# Patient Record
Sex: MALE | Race: BLACK OR AFRICAN AMERICAN | NOT HISPANIC OR LATINO | URBAN - METROPOLITAN AREA
[De-identification: names, ages, dates, MRNs, and addresses within clinical notes are randomized per-mention and may not be internally consistent; named-entity substitution may affect disease eponyms.]

---

## 2019-08-28 ENCOUNTER — INPATIENT (INPATIENT)
Facility: HOSPITAL | Age: 34
LOS: 1 days | Discharge: HOME | DRG: 603 | End: 2019-08-30
Attending: INTERNAL MEDICINE | Admitting: INTERNAL MEDICINE
Payer: MEDICAID

## 2019-08-28 VITALS
RESPIRATION RATE: 18 BRPM | DIASTOLIC BLOOD PRESSURE: 82 MMHG | SYSTOLIC BLOOD PRESSURE: 139 MMHG | OXYGEN SATURATION: 100 % | WEIGHT: 162.04 LBS | HEART RATE: 118 BPM | TEMPERATURE: 99 F

## 2019-08-28 DIAGNOSIS — B20 HUMAN IMMUNODEFICIENCY VIRUS [HIV] DISEASE: ICD-10-CM

## 2019-08-28 DIAGNOSIS — R69 ILLNESS, UNSPECIFIED: ICD-10-CM

## 2019-08-28 DIAGNOSIS — Z98.890 OTHER SPECIFIED POSTPROCEDURAL STATES: Chronic | ICD-10-CM

## 2019-08-28 DIAGNOSIS — F41.9 ANXIETY DISORDER, UNSPECIFIED: ICD-10-CM

## 2019-08-28 DIAGNOSIS — L03.119 CELLULITIS OF UNSPECIFIED PART OF LIMB: ICD-10-CM

## 2019-08-28 DIAGNOSIS — Z91.89 OTHER SPECIFIED PERSONAL RISK FACTORS, NOT ELSEWHERE CLASSIFIED: ICD-10-CM

## 2019-08-28 DIAGNOSIS — F19.20 OTHER PSYCHOACTIVE SUBSTANCE DEPENDENCE, UNCOMPLICATED: ICD-10-CM

## 2019-08-28 DIAGNOSIS — Z72.51 HIGH RISK HETEROSEXUAL BEHAVIOR: ICD-10-CM

## 2019-08-28 DIAGNOSIS — Z29.9 ENCOUNTER FOR PROPHYLACTIC MEASURES, UNSPECIFIED: ICD-10-CM

## 2019-08-28 DIAGNOSIS — F15.10 OTHER STIMULANT ABUSE, UNCOMPLICATED: ICD-10-CM

## 2019-08-28 LAB
ALBUMIN SERPL ELPH-MCNC: 3.8 G/DL — SIGNIFICANT CHANGE UP (ref 3.3–5)
ALP SERPL-CCNC: 75 U/L — SIGNIFICANT CHANGE UP (ref 40–120)
ALT FLD-CCNC: 30 U/L — SIGNIFICANT CHANGE UP (ref 10–45)
ANION GAP SERPL CALC-SCNC: 10 MMOL/L — SIGNIFICANT CHANGE UP (ref 5–17)
APTT BLD: 26.9 SEC — LOW (ref 27.5–36.3)
AST SERPL-CCNC: 33 U/L — SIGNIFICANT CHANGE UP (ref 10–40)
BASOPHILS # BLD AUTO: 0.03 K/UL — SIGNIFICANT CHANGE UP (ref 0–0.2)
BASOPHILS NFR BLD AUTO: 0.3 % — SIGNIFICANT CHANGE UP (ref 0–2)
BILIRUB SERPL-MCNC: 0.2 MG/DL — SIGNIFICANT CHANGE UP (ref 0.2–1.2)
BUN SERPL-MCNC: 11 MG/DL — SIGNIFICANT CHANGE UP (ref 7–23)
CALCIUM SERPL-MCNC: 9.4 MG/DL — SIGNIFICANT CHANGE UP (ref 8.4–10.5)
CHLORIDE SERPL-SCNC: 102 MMOL/L — SIGNIFICANT CHANGE UP (ref 96–108)
CO2 SERPL-SCNC: 28 MMOL/L — SIGNIFICANT CHANGE UP (ref 22–31)
CREAT SERPL-MCNC: 0.85 MG/DL — SIGNIFICANT CHANGE UP (ref 0.5–1.3)
EOSINOPHIL # BLD AUTO: 0.09 K/UL — SIGNIFICANT CHANGE UP (ref 0–0.5)
EOSINOPHIL NFR BLD AUTO: 0.9 % — SIGNIFICANT CHANGE UP (ref 0–6)
GLUCOSE SERPL-MCNC: 69 MG/DL — LOW (ref 70–99)
HCT VFR BLD CALC: 42 % — SIGNIFICANT CHANGE UP (ref 39–50)
HGB BLD-MCNC: 13.4 G/DL — SIGNIFICANT CHANGE UP (ref 13–17)
IMM GRANULOCYTES NFR BLD AUTO: 1.7 % — HIGH (ref 0–1.5)
INR BLD: 1.03 — SIGNIFICANT CHANGE UP (ref 0.88–1.16)
LACTATE SERPL-SCNC: 1.9 MMOL/L — SIGNIFICANT CHANGE UP (ref 0.5–2)
LYMPHOCYTES # BLD AUTO: 1.87 K/UL — SIGNIFICANT CHANGE UP (ref 1–3.3)
LYMPHOCYTES # BLD AUTO: 19.6 % — SIGNIFICANT CHANGE UP (ref 13–44)
MCHC RBC-ENTMCNC: 29.7 PG — SIGNIFICANT CHANGE UP (ref 27–34)
MCHC RBC-ENTMCNC: 31.9 GM/DL — LOW (ref 32–36)
MCV RBC AUTO: 93.1 FL — SIGNIFICANT CHANGE UP (ref 80–100)
MONOCYTES # BLD AUTO: 1.12 K/UL — HIGH (ref 0–0.9)
MONOCYTES NFR BLD AUTO: 11.7 % — SIGNIFICANT CHANGE UP (ref 2–14)
NEUTROPHILS # BLD AUTO: 6.29 K/UL — SIGNIFICANT CHANGE UP (ref 1.8–7.4)
NEUTROPHILS NFR BLD AUTO: 65.8 % — SIGNIFICANT CHANGE UP (ref 43–77)
NRBC # BLD: 0 /100 WBCS — SIGNIFICANT CHANGE UP (ref 0–0)
PLATELET # BLD AUTO: 295 K/UL — SIGNIFICANT CHANGE UP (ref 150–400)
POTASSIUM SERPL-MCNC: 4.2 MMOL/L — SIGNIFICANT CHANGE UP (ref 3.5–5.3)
POTASSIUM SERPL-SCNC: 4.2 MMOL/L — SIGNIFICANT CHANGE UP (ref 3.5–5.3)
PROT SERPL-MCNC: 7.6 G/DL — SIGNIFICANT CHANGE UP (ref 6–8.3)
PROTHROM AB SERPL-ACNC: 11.6 SEC — SIGNIFICANT CHANGE UP (ref 10–12.9)
RBC # BLD: 4.51 M/UL — SIGNIFICANT CHANGE UP (ref 4.2–5.8)
RBC # FLD: 13.2 % — SIGNIFICANT CHANGE UP (ref 10.3–14.5)
SODIUM SERPL-SCNC: 140 MMOL/L — SIGNIFICANT CHANGE UP (ref 135–145)
WBC # BLD: 9.56 K/UL — SIGNIFICANT CHANGE UP (ref 3.8–10.5)
WBC # FLD AUTO: 9.56 K/UL — SIGNIFICANT CHANGE UP (ref 3.8–10.5)

## 2019-08-28 PROCEDURE — 99223 1ST HOSP IP/OBS HIGH 75: CPT | Mod: GC

## 2019-08-28 PROCEDURE — 93971 EXTREMITY STUDY: CPT | Mod: 26,RT

## 2019-08-28 PROCEDURE — 73701 CT LOWER EXTREMITY W/DYE: CPT | Mod: 26,RT

## 2019-08-28 PROCEDURE — 73630 X-RAY EXAM OF FOOT: CPT | Mod: 26,RT

## 2019-08-28 PROCEDURE — 90792 PSYCH DIAG EVAL W/MED SRVCS: CPT

## 2019-08-28 PROCEDURE — 99285 EMERGENCY DEPT VISIT HI MDM: CPT | Mod: 25

## 2019-08-28 PROCEDURE — 93010 ELECTROCARDIOGRAM REPORT: CPT

## 2019-08-28 PROCEDURE — 99053 MED SERV 10PM-8AM 24 HR FAC: CPT

## 2019-08-28 RX ORDER — BICTEGRAVIR SODIUM, EMTRICITABINE, AND TENOFOVIR ALAFENAMIDE FUMARATE 30; 120; 15 MG/1; MG/1; MG/1
1 TABLET ORAL DAILY
Refills: 0 | Status: DISCONTINUED | OUTPATIENT
Start: 2019-08-28 | End: 2019-08-30

## 2019-08-28 RX ORDER — QUETIAPINE FUMARATE 200 MG/1
12.5 TABLET, FILM COATED ORAL ONCE
Refills: 0 | Status: COMPLETED | OUTPATIENT
Start: 2019-08-28 | End: 2019-08-28

## 2019-08-28 RX ORDER — ENOXAPARIN SODIUM 100 MG/ML
40 INJECTION SUBCUTANEOUS EVERY 24 HOURS
Refills: 0 | Status: DISCONTINUED | OUTPATIENT
Start: 2019-08-28 | End: 2019-08-28

## 2019-08-28 RX ORDER — AMPICILLIN SODIUM AND SULBACTAM SODIUM 250; 125 MG/ML; MG/ML
3 INJECTION, POWDER, FOR SUSPENSION INTRAMUSCULAR; INTRAVENOUS EVERY 6 HOURS
Refills: 0 | Status: DISCONTINUED | OUTPATIENT
Start: 2019-08-28 | End: 2019-08-29

## 2019-08-28 RX ORDER — VANCOMYCIN HCL 1 G
1250 VIAL (EA) INTRAVENOUS EVERY 12 HOURS
Refills: 0 | Status: DISCONTINUED | OUTPATIENT
Start: 2019-08-28 | End: 2019-08-30

## 2019-08-28 RX ORDER — SODIUM CHLORIDE 9 MG/ML
1000 INJECTION INTRAMUSCULAR; INTRAVENOUS; SUBCUTANEOUS ONCE
Refills: 0 | Status: COMPLETED | OUTPATIENT
Start: 2019-08-28 | End: 2019-08-28

## 2019-08-28 RX ORDER — NICOTINE POLACRILEX 2 MG
2 GUM BUCCAL
Refills: 0 | Status: DISCONTINUED | OUTPATIENT
Start: 2019-08-28 | End: 2019-08-30

## 2019-08-28 RX ORDER — HALOPERIDOL DECANOATE 100 MG/ML
5 INJECTION INTRAMUSCULAR EVERY 8 HOURS
Refills: 0 | Status: DISCONTINUED | OUTPATIENT
Start: 2019-08-28 | End: 2019-08-30

## 2019-08-28 RX ORDER — VANCOMYCIN HCL 1 G
1000 VIAL (EA) INTRAVENOUS ONCE
Refills: 0 | Status: COMPLETED | OUTPATIENT
Start: 2019-08-28 | End: 2019-08-28

## 2019-08-28 RX ADMIN — Medication 1000 MILLIGRAM(S): at 03:25

## 2019-08-28 RX ADMIN — BICTEGRAVIR SODIUM, EMTRICITABINE, AND TENOFOVIR ALAFENAMIDE FUMARATE 1 TABLET(S): 30; 120; 15 TABLET ORAL at 12:14

## 2019-08-28 RX ADMIN — SODIUM CHLORIDE 1000 MILLILITER(S): 9 INJECTION INTRAMUSCULAR; INTRAVENOUS; SUBCUTANEOUS at 03:20

## 2019-08-28 RX ADMIN — AMPICILLIN SODIUM AND SULBACTAM SODIUM 200 GRAM(S): 250; 125 INJECTION, POWDER, FOR SUSPENSION INTRAMUSCULAR; INTRAVENOUS at 23:44

## 2019-08-28 RX ADMIN — Medication 166.67 MILLIGRAM(S): at 15:06

## 2019-08-28 RX ADMIN — Medication 2 MILLIGRAM(S): at 10:25

## 2019-08-28 RX ADMIN — SODIUM CHLORIDE 1000 MILLILITER(S): 9 INJECTION INTRAMUSCULAR; INTRAVENOUS; SUBCUTANEOUS at 02:20

## 2019-08-28 RX ADMIN — Medication 250 MILLIGRAM(S): at 02:27

## 2019-08-28 RX ADMIN — AMPICILLIN SODIUM AND SULBACTAM SODIUM 200 GRAM(S): 250; 125 INJECTION, POWDER, FOR SUSPENSION INTRAMUSCULAR; INTRAVENOUS at 14:24

## 2019-08-28 RX ADMIN — AMPICILLIN SODIUM AND SULBACTAM SODIUM 200 GRAM(S): 250; 125 INJECTION, POWDER, FOR SUSPENSION INTRAMUSCULAR; INTRAVENOUS at 17:53

## 2019-08-28 RX ADMIN — QUETIAPINE FUMARATE 12.5 MILLIGRAM(S): 200 TABLET, FILM COATED ORAL at 23:44

## 2019-08-28 NOTE — H&P ADULT - NSICDXFAMILYHX_GEN_ALL_CORE_FT
FAMILY HISTORY:  Family history of substance abuse, Mother, crack/cocaine  FH: HIV infection, Mother

## 2019-08-28 NOTE — ED BEHAVIORAL HEALTH ASSESSMENT NOTE - DESCRIPTION
from [place] Pt was guarded initially with personal information. He however was cooperative with this exam HIV
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
## 2019-08-28 NOTE — ED ADULT TRIAGE NOTE - CHIEF COMPLAINT QUOTE
pt complaining of pain to two wounds on right foot with purulent drainage x 1 week states he was given IV abx at another hospital over the last few days and denies new trauma/ injury

## 2019-08-28 NOTE — ED ADULT NURSE NOTE - NS ED NURSE DISCH DISPOSITION
Physical Therapy Evaluation    Visit Count: 1   Plan of Care: 6/3/2019 Through: 6/17/2019  Insurance Information: Payor: [employer] - Medicare Complete  Authorization Needed: No  Maximum Visit Limit: Based on medical necessity at point of claim  CoPay: $40.00  Notes: Follow Medicare guidelines  Call Ref #: Online  “Evaluation requires MD, NP, PA, or DO co-signature”  Referred by: Tamika Brumfield MD; Next provider visit (if known/scheduled): 6/12/19  Medical Diagnosis (from order):  719.41, 338.29 (ICD-9-CM) - M25.512, G89.29 (ICD-10-CM) - Chronic left shoulder pain  Treatment Diagnosis: Shoulder symptoms with increased pain/symptoms, impaired posture, impaired strength, impaired range of motion, impaired scapulohumeral rhythm, impaired muscle length/flexibility, increased swelling, impaired joint mobility/play, impaired activity tolerance, impaired body mechanics    Date of Surgery: 5/30/2019 Surgery Performed: Left  shoulder arthroscopy with rotator cuff repair, subacromial decompression, distal clavicle excision - Left and Arthroscopic Rotator Cuff Repair - Left  Physician Guidelines/Precautions: per protocol   Chart reviewed at time of initial evaluation (relevant co-morbidities, allergies, tests and medications listed): Colon cancer, COPD, HLD, RAD, cervical fusion, lumbar fusion    SUBJECTIVE   Description of Problem and/or Mechanism of Injury: Patient had fall in January and was holding onto a fencepost with his L shoulder.  This caused extreme abduction.  After several months of worsening pain he went through with above listed procedure.  Reports that his pain has been well managed, but he has struggled with daily tasks, due to living alone.  Has not been able to perform exercises as much as he would like as a result.    Pain:  Intensity (0-10 scale): Current: 1; Pain Range (best-worse): 1-5  Location: left shoulder   Quality/Description: Sharp  Relieving/Alleviating factors: rest, prescribed medication, avoiding  movement in the involved area    Function:  Hand Dominance: left  Limitations and exacerbating factors: pain, difficulty with all activities/tasks with involved extremity, age appropriate activities, bed mobility, computer tasks , dressing, driving, grooming/hygiene, house/yard work , lifting/carrying, opening a jar, pushing/pulling, reaching overhead, behind back and out to side, sleep disturbed  Prior level: independent with all activities of daily living and instrumental activities of daily living  Patient Goals: \"get back to active lifestyle.\"    Prior Treatment: no therapies in the past year for current condition. Hospitalization, home health services or skilled nursing facility in the last 30 days: No, per patient.  Home Environment/Social Support: Patient lives alone; consistent assist from family/friends available    Safety:  Do you feel safe at home, work and/or school? yes, per patient  Patient denies 2 or more falls or an unexplained fall with injury in the last year, further testing not required     OBJECTIVE   Posture/Observation:  Forward head  Rounded shoulders  Protracted scapulae  Anterior tilt of of bilateral scapulae    Range of Motion (degrees)   Left Right   Date Initial Initial   Shoulder Flexion (170-180) 20 degrees PROM    Shoulder Extension (50-60)     Shoulder Abduction (170-180) 40 degrees PROM    Shoulder Adduction (50-75)     Shoulder Internal Rotation ()     Shoulder External Rotation (80-90)     Elbow Flexion (140-150)     Elbow Extension (0-10)     Forearm Supination (90)     Forearm Pronation (90)     Reported in degrees, active range of motion recorded unless noted as AA=active assistive or P=passive; standard testing positions unless otherwise noted, norms included in ( ); *=pain   uninvolved extremity motion within functional/normal limits., internal rotation to soft tissue of abdomin with elbow at side    Strength:   Testing not indicated due to post-op status/physician  guidelines    Palpation:  General tenderness throughout anterior shoulder structures.     Outcome Measures:   Quick Disabilities of the Arm, Shoulder and Hand (QDASH): 34 (11-55); Calculated Score: 52.27 (0-100) (scored 0-100; a higher score indicates greater disability)    Initial Treatment   Initial evaluation completed.    Therapeutic Exercise:   Pendulum exercises  Cervical, elbow, wrist and hand AROM  Explanation of findings from evaluation, treatment options, and expected outcomes of physical therapy.  Education of relevant anatomy and possible effects on current symptoms.  Education of activity modification to prevent increase in symptoms.    Education of techniques to manage pain and prevent progression of symptoms.     Skilled input: verbal instruction/cues, tactile instruction/cues for proper performance of HEP    Initial Home Program:  * above=instructed home program    See therapeutic exercise above    Writer verbally educated the patient and received verbal consent from the patient on hand placement, positioning of patient, and techniques to be performed today as described above and how they are pertinent to the patient's plan of care.     Suggestions for next session as indicated: progress per plan of care, review home exercise program, progress per guidelines    ASSESSMENT   76 year old male patient has signs and symptoms consistent with status post rotator cuff repair that has reported functional limitations listed above.     Patient will benefit from skilled therapy and rehab potential is good based on assessment above   Predicted patient presentation: Moderate (evolving) - Patient comorbidities and complexities, as defined above, may have varying impact on steady progress for prescribed plan of care.    PLAN   Goals: To be obtained by end of this plan of care:  1. Patient will be independent with progressed and modified home exercise program   2. Decrease pain/symptoms to 0-2/10  3. Improve  involved strength to 4+/5  4. Improve involved range of motion to WFL  through improvements listed above patient will:  5. Be able to complete upper body dressing independently  6. Be able to sleep 6 hours without disruption from pain  7. Quick DASH: Patient will complete form to reflect an improved score from initial score of 34 to less than or equal to 20 to indicate patient reported improvement in function/disability/impairment.    The following skilled interventions to be implemented to achieve above:  Activities of Daily Living/Self Care (28718)  Manual Therapy (90060)  Neuromuscular Re-Education (17787)  Therapeutic Activity (08552)  Therapeutic Exercise (32597)  Electrical Stimulation (45260//52208)   Heat/Cold (63767)  Ultrasound/Phonophoresis (41196)    Frequency/Duration: 2 times per week for 16 weeks with tapering as the patient progresses for an estimated total of 32 visits    patient involved in and agreed to plan of care and goals.   Attendance policy provided at time of evaluation.    Patient Education:  Who will be receiving education: patient  Are they ready to learn: yes  Preferred learning style: written, verbal, demonstration  Barriers to learning: no barriers apparent at this time   Attendance policy given at time of initial evaluation.  Result of initial outlined education: Verbalizes understanding and Demonstrates understanding    THERAPY DAILY BILLING   Insurance: 1. [facility] MEDICARE COMPLETE 2. N/A    Evaluation Procedures:  Physical Therapy Evaluation: Moderate Complexity    Timed Procedures:  Therapeutic Exercise, 10 minutes    Untimed Procedures:  No untimed codes were used on this date of service    Total Treatment Time: 45 minutes        The referring provider's electronic or written signature on the evaluation authorizes the therapy plan of care and certifies the need for these services, furnished under this plan of care while under their care.  Electronically sent for referring  provider signature   Admitted

## 2019-08-28 NOTE — H&P ADULT - NSHPSOCIALHISTORY_GEN_ALL_CORE
Current smoker (approximately 1 PPD x years), denies EtOH abuse, admits to crack/cocaine use (last used last week)

## 2019-08-28 NOTE — H&P ADULT - NSHPPHYSICALEXAM_GEN_ALL_CORE
.  VITAL SIGNS:  T(C): 37.1 (08-28-19 @ 03:45), Max: 37.2 (08-28-19 @ 01:44)  T(F): 98.7 (08-28-19 @ 03:45), Max: 98.9 (08-28-19 @ 01:44)  HR: 94 (08-28-19 @ 03:45) (94 - 118)  BP: 132/80 (08-28-19 @ 03:45) (132/80 - 139/82)  BP(mean): --  RR: 17 (08-28-19 @ 03:45) (17 - 18)  SpO2: 99% (08-28-19 @ 03:45) (99% - 100%)  Wt(kg): --    PHYSICAL EXAM:    Constitutional: WDWN resting comfortably in bed; NAD  Head: NC/AT  Eyes: PERRL, EOMI, anicteric sclera  ENT: no nasal discharge; uvula midline, no oropharyngeal erythema or exudates; MMM  Neck: supple; no JVD or thyromegaly  Respiratory: CTA B/L; no W/R/R, no retractions  Cardiac: +S1/S2; RRR; no M/R/G; PMI non-displaced  Gastrointestinal: abdomen soft, NT/ND; no rebound or guarding; +BSx4  Genitourinary: normal external genitalia  Back: spine midline, no bony tenderness or step-offs; no CVAT B/L  Extremities: WWP, no clubbing or cyanosis; no peripheral edema  Musculoskeletal: NROM x4; no joint swelling, tenderness or erythema  Vascular: 2+ radial, femoral, DP/PT pulses B/L  Dermatologic: skin warm, dry and intact; no rashes, wounds, or scars  Lymphatic: no submandibular or cervical LAD  Neurologic: AAOx3; CNII-XII grossly intact; no focal deficits  Psychiatric: affect and characteristics of appearance, verbalizations, behaviors are appropriate .  VITAL SIGNS:  T(C): 37.1 (08-28-19 @ 03:45), Max: 37.2 (08-28-19 @ 01:44)  T(F): 98.7 (08-28-19 @ 03:45), Max: 98.9 (08-28-19 @ 01:44)  HR: 94 (08-28-19 @ 03:45) (94 - 118)  BP: 132/80 (08-28-19 @ 03:45) (132/80 - 139/82)  BP(mean): --  RR: 17 (08-28-19 @ 03:45) (17 - 18)  SpO2: 99% (08-28-19 @ 03:45) (99% - 100%)  Wt(kg): --    PHYSICAL EXAM:    Constitutional: WDWN  male, soft-spoken, resting comfortably in bed; NAD  Head: NC/AT  Eyes: PERRL, EOMI, anicteric sclera  ENT: no nasal discharge; uvula midline, no oropharyngeal erythema or exudates; MMM  Neck: supple; no JVD or thyromegaly  Respiratory: CTA B/L; no W/R/R, no retractions  Cardiac: +S1/S2; RRR; no M/R/G; PMI non-displaced  Gastrointestinal: abdomen soft, NT/ND; no rebound or guarding; +BSx4  Genitourinary: normal external genitalia  Back: spine midline, no bony tenderness or step-offs; no CVAT B/L  Extremities: WWP, no clubbing or cyanosis; RLE edema with associated erythema and warmth to palpation  Musculoskeletal: NROM x4; no joint swelling, tenderness or erythema  Vascular: 2+ radial, femoral, DP/PT pulses B/L  Dermatologic: e/o of recent I&D overlying dorsal aspect of distal 4th/5th metatarsal, no purulence expressed from site, also e/o recent I&D overlying insertion of achilles tendon with no purulence expressed, very tender to palpation, no crepitus on exam  Lymphatic: no submandibular or cervical LAD  Neurologic: AAOx3; CNII-XII grossly intact; no focal deficits  Psychiatric: affect and characteristics of appearance, verbalizations, behaviors are appropriate

## 2019-08-28 NOTE — ED BEHAVIORAL HEALTH ASSESSMENT NOTE - DETAILS
CL psychiatry to follow pt was recently admitted to [facility] psychiatry mother with likely psychiatric diagnosis mother with crack cocaine use foot pain pain to be admitted to medicine unable ot reach

## 2019-08-28 NOTE — H&P ADULT - HISTORY OF PRESENT ILLNESS
34 y/o PMHx HIV (last CD4 360s, VLUD as of May 2019 per patient, on Biktarvy), anxiety presenting to [facility] ED with complaints of R foot pain and swelling for the last 8 days. Patient's history dates back to earlier this mon    ED VS: T 98.9 F; ; /82; RR 18; Sp02 100 RA  ED Course: Labs with no leukocytosis, negative lactate (1.9), sCr 0.89. ECG NSR with ?LA enlargement. Xray R foot performed, pending official read,   RLE duplex performed, 34 y/o PMHx HIV (sexually contracted, dx 2007, last CD4 360s, VLUD as of May 2019 per patient, on Biktarvy), anxiety presenting to [facility] ED with complaints of R foot pain and swelling for the last 8 days. Patient's history dates back to earlier this month when he was at [facility] inpatient psych for a reported anxiety attack. Patient is vague surrounding details of his inpatient stay. While there, approximately 8 days ago, patient noted have developed R foot pain with associated swelling and redness. He reports telling the staff there at the time about his R foot pain however notes his complaints were disregarded. Denies any trauma to the area. He notes that approximately 2 days ago, he was discharged from inpatient psych, after which he went back to a friend's house to eat/change his clothes. From there, he called 911 to pick him up for his R foot pain. He then brought back to [facility] ED for further evaluation. Patient states there he had imaging done, was given IVF and abxs and had an I&D done on the dorsum of his foot (between the distal 4th and 5th metatarsal bones) and at around the insertion of the achilles tendon where he noted pus came out of the foot. There were no reported beds at [facility] so he requested that he be transfered to another hospital, [facility]. There, he notes he was told he may need surgery for unclear reasons however given the wait time there and his dissatisfaction with the care he was receiving, he left the hospital and came to [facility] for further evaluation. ROS other negative for fevers, chills, NS, chest pain, shortness of breath. Remainder of ROS negative.      ED VS: T 98.9 F; ; /82; RR 18; Sp02 100 RA  ED Course: Labs with no leukocytosis, negative lactate (1.9), sCr 0.89. ECG NSR with ?LA enlargement. Xray R foot performed, pending official read,   RLE duplex performed,

## 2019-08-28 NOTE — PROGRESS NOTE ADULT - PROBLEM SELECTOR PLAN 1
Patient presented with worsening R foot pain x8d. Found to have purulent lesions at R dorsum and Achilles insertion with swelling, s/p I&D and UNK IV abx at [facility] ED. RLE CT showed cellulitis with abscesses at Achilles tendon - no evidence of osteomyelitis or nec fasc. Duplex US showed no DVTs. Pt is s/p 1g Vancomycin in ED, now treated with 1250 mg Vanc q12h. Pt is afebrile with normal WBC.   - c/w Vancomycin 1250mg q12h. Next trough tomorrow at 1pm  - f/u Bcx: no growth to date  - consult podiatry for possible I&D Patient presented with worsening R foot pain x8d. Found to have purulent lesions at R dorsum and Achilles insertion with swelling, s/p I&D and UNK IV abx at [facility] ED. RLE CT showed cellulitis with abscesses at Achilles tendon - no evidence of osteomyelitis or nec fasc. Duplex US showed no DVTs. Pt is s/p 1g Vancomycin in ED, now treated with 1250 mg Vanc q12h. Pt is afebrile with normal WBC.   -c/w Vancomycin 1250mg q12h. Next trough tomorrow at 1pm  -c/w unasyn 3g q6, given IV drug use in legs  - f/u Bcx: no growth to date  - consult podiatry for possible I&D

## 2019-08-28 NOTE — H&P ADULT - PROBLEM SELECTOR PLAN 5
Patient endorses at least 4 sexual female partners within the last 6 months, inconsistent condom use, PE with inguinal LAD  -G/C (Urine)  -RPR

## 2019-08-28 NOTE — H&P ADULT - PROBLEM SELECTOR PLAN 2
Patient with history of HIV, sexually contracted, diagnosed in 2007, on Biktarvy however reports missed last week of medication as did not have a script for this. Also noted being on Bactrim, last reported CD4 in 360s with VLUD in May 2019.   -f/u VL, CD4  -c/w Biktarvy  -c/w Bactrim for PCP ppx for now, can discontinue if CD4>200 Patient with history of HIV, sexually contracted, diagnosed in 2007, on Biktarvy however reports missed last week of medication as did not have a script for this. Also noted being on Bactrim, last reported CD4 in 360s with VLUD in May 2019.   -f/u VL, CD4  -c/w Biktarvy  -If CD4 <200, re-start Bactrim

## 2019-08-28 NOTE — ED BEHAVIORAL HEALTH ASSESSMENT NOTE - DIFFERENTIAL
crystal meth use disorder  r/o substance induced psychosis/anxiety  r/o DIVYA  r/o Bipolar disorder, current episode hypomanic

## 2019-08-28 NOTE — PROGRESS NOTE ADULT - PROBLEM SELECTOR PLAN 5
Endorses 4+ sexual partners (female) within last 6 months w/ inconsistent condom use. Pt has inguinal LAD on exam on presentation.  - F/u RPR and G/C

## 2019-08-28 NOTE — ED PROVIDER NOTE - OBJECTIVE STATEMENT
33M poor historian with a h/o anxiety, HIV and substance abuse (crystal meth) who present with right foot cellulitis. He states his foot started hurting him about 8 days ago, he was admitted to [facility] for anxiety and then transfer to [facility] when his foot started hurting. He states he was given antibiotics and 2 areas on his foot were drained. he felt that he was "waiting around forever" at [facility] so he left and came to [facility]. C/o pain and swelling to right foot, +chills, no fever, no cp/sob, no dizziness/LH, denies trauma to foot, no hx of DVt. he denies SI/HI or hallucinations at this time.

## 2019-08-28 NOTE — H&P ADULT - PROBLEM SELECTOR PLAN 6
F: no IVF indicated   E: Replete electrolytes PRN, Goal: K>4, Mg>2  N: Regular diet    DVT ppx: Lovenox 40 mg daily  CODE: FULL  Dispo: Admit to F

## 2019-08-28 NOTE — ED ADULT NURSE REASSESSMENT NOTE - NS ED NURSE REASSESS COMMENT FT1
pt. using urinal, per request, will check cbg due to serum glucose at time of lab draw, as noted, has remained a-symptomatic throughout stay

## 2019-08-28 NOTE — ED PROVIDER NOTE - PHYSICAL EXAMINATION
GEN: Well developed, well nourished, awake, alert, oriented to person, place, time/situation and appears anxious. NTAF  ENT: Airway patent, Nasal mucosa clear. Mouth with normal mucosa.  EYES: Clear bilaterally.  RESPIRATORY: Breathing comfortably with normal RR.  CARDIAC: Regular rate and rhythm  ABDOMEN: Soft, nontender, +bowel sounds, no rebound, rigidity, or guarding.  MSK: Range of motion is not limited, +RLE swelling, erythema and TTP, distal pulses present, ext warm and well-perfused.  NEURO: Alert and oriented, no focal deficits.  SKIN: Skin normal color for race, warm, dry . Right foot STS and cellulitis with open wound (?previous I&D sites) to right posterio ankle and right dorsum of foot near base of 4th toe. Very TTP, no active bleeding or purulence, no crepitus or gangrene.  PSYCH: Alert and oriented to person, place, time/situation. anxious mood and affect. no apparent risk to self or others.

## 2019-08-28 NOTE — ED BEHAVIORAL HEALTH ASSESSMENT NOTE - PSYCHIATRIC ISSUES AND PLAN (INCLUDE STANDING AND PRN MEDICATION)
Seroquel 25 mg qhs to address hypomanic symtpoms/anxiety/insomnia. Seroquel 25 mg q 6 hrs prn anxiety/psychosis

## 2019-08-28 NOTE — PROGRESS NOTE ADULT - PROBLEM SELECTOR PLAN 4
H/o anxiety requiring admission to [facility] unit for 6days, for which he was discharged 2 days prior to presenting to ED. On Risperdal 2mg at home for anxiety.   - Continue Risperdal  - Obtain collateral from [facility] inpatient psych for recent admission H/o anxiety requiring admission to [facility] unit for 6days, for which he was discharged 2 days prior to presenting to ED. On Risperdal 2mg at home for anxiety.   - Continue Risperdal  - Pysch consult given aggressive behavior and recent [facility] admission at [facility]  - Obtain collateral from [facility] inpatient psych for recent admission

## 2019-08-28 NOTE — H&P ADULT - NSICDXPASTMEDICALHX_GEN_ALL_CORE_FT
PAST MEDICAL HISTORY:  Anxiety     Drug abuse and dependence     HIV (human immunodeficiency virus infection)

## 2019-08-28 NOTE — PROGRESS NOTE ADULT - ASSESSMENT
33M w/ PMH HIV (since 2007, last CD4 in 360s, VLUD in May 2019 per pt), h/o recent inpatient psych admission at [facility], presenting to ED with R foot painful lesions and swelling x8d, currently admitted for purulent cellulitis, treated with Vancomycin.

## 2019-08-28 NOTE — ED ADULT NURSE NOTE - OBJECTIVE STATEMENT
33M poor historian with a h/o anxiety, HIV and substance abuse (crystal meth) who present with right foot cellulitis. He states his foot started hurting him about 8 days ago, he was admitted to [facility] for anxiety and then transfer to [facility] when his foot started hurting. He states he was given antibiotics and 2 areas on his foot were drained. he felt that he was "waiting around forever" at [facility] so he left and came to [facility]. C/o pain and swelling to right foot, +chills, no fever, no cp/sob, no dizziness/LH, denies trauma to foot, no hx of DVt. he denies SI/HI or hallucinations at this time.  Edema noted to Rt foot, CMS in-tact, 2 abscess drainage sites present

## 2019-08-28 NOTE — H&P ADULT - PROBLEM SELECTOR PLAN 1
Patient presenting with worsening R foot pain for the last 8 days. Patient presenting with worsening R foot pain for the last 8 days. Found to have an abscess on dorsum of R foot between distal insertion of metastarsal heads    -s/p Vanc 1g in ED, will resume with vanc 1.25 g q12h  -f/u Bcx  -CT RLE to further assess for nec fascitis, OM

## 2019-08-28 NOTE — ED PROVIDER NOTE - CLINICAL SUMMARY MEDICAL DECISION MAKING FREE TEXT BOX
33M with right foot cellulitis, poor historian, but appears to have has 2 abscesses drained at outside hospital and left prior to completion or workup/treatment. Foot very swollen, TTP and cellulitic, well-perfused. Will get labs, BCx, XR foot, RLE US to r/o DVT, admit for IV abx and further mgmt.

## 2019-08-28 NOTE — ED BEHAVIORAL HEALTH ASSESSMENT NOTE - DESCRIPTION (FIRST USE, LAST USE, QUANTITY, FREQUENCY, DURATION)
no use for two weeks IV crystal meth use, last use 2 weeks ago. Reports hx/o inpatient and outpatient treatment
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
## 2019-08-28 NOTE — H&P ADULT - ATTENDING COMMENTS
Patient seen and examined at bedside. Agree with exam, a/p as above with the following addendum/edits:    [facility] obtained further collateral. At [facility] for what seems like schizophrenia. Current IV meth use and possibly injects into legs. This is likely the cause of his abscesses on his feet. Denies licking needles before IV drug use. However will add Unasyn for gram neg and anaerobic coverage. C/w Vanc. Psych consulted. F/u podiatry recs, now s/p I and D. F/u culture data. [facility]A consult.

## 2019-08-28 NOTE — H&P ADULT - PROBLEM SELECTOR PLAN 3
Patient with history of current crystal meth use, last used last week. Denies IVDU  -Urine toxicology  - consult

## 2019-08-28 NOTE — PROGRESS NOTE ADULT - SUBJECTIVE AND OBJECTIVE BOX
INTERVAL HPI/OVERNIGHT EVENTS: As per nurse and patient, no o/n events.    SUBJECTIVE: Patient was seen and examined at bedside.  No complaints at this time. Patient denies: fever, chills, dizziness, weakness, HA, Changes in vision, CP, palpitations, SOB, cough, N/V/D/C, dysuria, changes in bowel movements, LE edema. ROS otherwise negative.    VITAL SIGNS:  T(F): 98.5 (08-28-19 @ 07:24)  HR: 93 (08-28-19 @ 07:24)  BP: 122/75 (08-28-19 @ 07:24)  RR: 17 (08-28-19 @ 07:24)  SpO2: 99% (08-28-19 @ 07:24)  Wt(kg): --    PHYSICAL EXAM:    Constitutional: WDWN, NAD  HEENT: PERRL, EOMI, sclera non-icteric, neck supple, trachea midline, no masses, no JVD, MMM, good dentition  Respiratory: CTA b/l, good air entry b/l, no wheezing, no rhonchi, no rales, without accessory muscle use and no intercostal retractions  Cardiovascular: RRR, normal S1S2, no M/R/G  Gastrointestinal: soft, NTND, no masses palpable, BS normal  Extremities: Warm, well perfused, pulses equal bilateral upper and lower extremities, no edema, no clubbing. Capillary refill <2 sec  Neurological: AAOx3, CN Grossly intact  Skin: Normal temperature, warm, dry    MEDICATIONS  (STANDING):  bictegravir 50 mG/emtricitabine 200 mG/tenofovir alafenamide 25 mG (BIKTARVY) 1 Tablet(s) Oral daily  vancomycin  IVPB 1250 milliGRAM(s) IV Intermittent every 12 hours    MEDICATIONS  (PRN):  nicotine  Polacrilex Gum 2 milliGRAM(s) Oral every 3 hours PRN Nicotine craving      Allergies    No Known Allergies    Intolerances        LABS:                        12.1   9.01  )-----------( 310      ( 28 Aug 2019 11:02 )             37.6     08-28    140  |  102  |  11  ----------------------------<  69<L>  4.2   |  28  |  0.85    Ca    9.4      28 Aug 2019 02:22    TPro  7.6  /  Alb  3.8  /  TBili  0.2  /  DBili  x   /  AST  33  /  ALT  30  /  AlkPhos  75  08-28    PT/INR - ( 28 Aug 2019 02:22 )   PT: 11.6 sec;   INR: 1.03          PTT - ( 28 Aug 2019 02:22 )  PTT:26.9 sec      RADIOLOGY & ADDITIONAL TESTS:  Reviewed INTERVAL HPI/OVERNIGHT EVENTS: As per nurse and patient, no o/n events.    SUBJECTIVE: Patient was seen and examined at bedside.  Endorses continuing R foot pain and swelling with lesions draining brown colored pus. Pain is sharp in quality. Reports feeling intermittently feverish and chilly. Pt cannot recall which abx he received in [facility] ED, but notes he received Tylenol. Patient denies: dizziness, weakness, HA, Changes in vision, CP, palpitations, SOB, cough, N/V/D/C, dysuria, changes in bowel movements. ROS otherwise negative.    VITAL SIGNS:  T(F): 98.5 (08-28-19 @ 07:24)  HR: 93 (08-28-19 @ 07:24)  BP: 122/75 (08-28-19 @ 07:24)  RR: 17 (08-28-19 @ 07:24)  SpO2: 99% (08-28-19 @ 07:24)  Wt(kg): --    PHYSICAL EXAM:    Constitutional: distress due to pain, mild diaphoresis visible  HEENT: PERRL, EOMI, sclera non-icteric, neck supple, trachea midline, no masses, no JVD, MMM, good dentition  Respiratory: CTA b/l, good air entry b/l, no wheezing, no rhonchi, no rales, without accessory muscle use and no intercostal retractions  Cardiovascular: RRR, normal S1S2, no M/R/G  Gastrointestinal: soft, NTND, no masses palpable, BS normal  Extremities: R dorsum edematous and warm to touch. Lesion at R dorsum between 4th and 5th metatarsals, currently wrapped; no purulence noted. Lesion at R Achilles insertion point with purulent drainage. R foot exquisitely TTP.   Neurological: AAOx3, CN Grossly intact  Skin: Diaphoresis visible on face    MEDICATIONS  (STANDING):  bictegravir 50 mG/emtricitabine 200 mG/tenofovir alafenamide 25 mG (BIKTARVY) 1 Tablet(s) Oral daily  vancomycin  IVPB 1250 milliGRAM(s) IV Intermittent every 12 hours    MEDICATIONS  (PRN):  nicotine  Polacrilex Gum 2 milliGRAM(s) Oral every 3 hours PRN Nicotine craving      Allergies    No Known Allergies    Intolerances        LABS:                        12.1   9.01  )-----------( 310      ( 28 Aug 2019 11:02 )             37.6     08-28    140  |  102  |  11  ----------------------------<  69<L>  4.2   |  28  |  0.85    Ca    9.4      28 Aug 2019 02:22    TPro  7.6  /  Alb  3.8  /  TBili  0.2  /  DBili  x   /  AST  33  /  ALT  30  /  AlkPhos  75  08-28    PT/INR - ( 28 Aug 2019 02:22 )   PT: 11.6 sec;   INR: 1.03          PTT - ( 28 Aug 2019 02:22 )  PTT:26.9 sec      RADIOLOGY & ADDITIONAL TESTS:  Reviewed

## 2019-08-28 NOTE — H&P ADULT - PROBLEM SELECTOR PLAN 7
1) PCP Contacted on Admission: (Y/N) --> Name & Phone #: Flo Hyden (Primary Care)  2) Date of Contact with PCP: Can contact in AM to obtain collateral on history  3) PCP Contacted at Discharge: (Y/N)  4) Summary of Handoff Given to PCP:   5) Post-Discharge Appointment Date and Location:

## 2019-08-28 NOTE — PROGRESS NOTE ADULT - SUBJECTIVE AND OBJECTIVE BOX
Pt was asleep. This writer attempted to wake pt. However pt was not able vs not willing to speak with this writer.   As per SW pt has hx/o recent psychiatric admission at [facility]. Pt was diagnosed with "Bipolar disorder, anxiety, possibly schizophrenia vs substance induced psychosis and ongoing IIV crystal meth use" as per community SW. Pt has hx/o leaving hospitals AMA. As per SW he refused to discuss details of his medical and psychiatric history earlier due to lack of privacy in the ED.   Pt has a  in the community [facility] [phone] who provided above clinical information.    Please re-consult when pt is alert. For acute agitation may use Haldol 5 mg q 8 hrs prn and Ativan 2 mg q 8 hrs prn.   CL psychiatry will follow in am.

## 2019-08-28 NOTE — ED ADULT NURSE REASSESSMENT NOTE - NS ED NURSE REASSESS COMMENT FT1
pt. back from US, resting RT recumbent, nad or change in condition[, assessment on-going awaiting results/dispo., pt. utd, with understanding verbalized

## 2019-08-28 NOTE — H&P ADULT - PROBLEM SELECTOR PLAN 4
Patient reports history of anxiety, requiring recent admission to [facility] inpatient psych unit. Vague surrounding details of inpatient stay. Unclear if other underlying psychiatric issues. On Risperdol at home for which he notes is to help "calm him down"  -c/w Risperdal 2 mg daily  -Collateral from [facility] inpatient psych re: recent admission

## 2019-08-28 NOTE — CONSULT NOTE ADULT - SUBJECTIVE AND OBJECTIVE BOX
Attending: Meenutannamelissa    Patient is a 33y old  Male who presents with a chief complaint of R foot pain (28 Aug 2019 11:10)    HPI:  32 y/o PMHx HIV (sexually contracted, dx 2007, last CD4 360s, VLUD as of May 2019 per patient, on Biktarvy), anxiety presenting to [facility] ED with complaints of R foot pain and swelling for the last 8 days. Patient's history dates back to earlier this month when he was at [facility] inpatient psych for a reported anxiety attack. Patient is vague surrounding details of his inpatient stay. While there, approximately 8 days ago, patient noted have developed R foot pain with associated swelling and redness. He reports telling the staff there at the time about his R foot pain however notes his complaints were disregarded. Denies any trauma to the area. He notes that approximately 2 days ago, he was discharged from inpatient psych, after which he went back to a friend's house to eat/change his clothes. From there, he called 911 to pick him up for his R foot pain. He then brought back to [facility] ED for further evaluation. Patient states there he had imaging done, was given IVF and abxs and had an I&D done on the dorsum of his foot (between the distal 4th and 5th metatarsal bones) and at around the insertion of the achilles tendon where he noted pus came out of the foot. There were no reported beds at [facility] so he requested that he be transfered to another hospital, [facility]. There, he notes he was told he may need surgery for unclear reasons however given the wait time there and his dissatisfaction with the care he was receiving, he left the hospital and came to [facility] for further evaluation. ROS other negative for fevers, chills, NS, chest pain, shortness of breath. Remainder of ROS negative.      ED VS: T 98.9 F; ; /82; RR 18; Sp02 100 RA  ED Course: Labs with no leukocytosis, negative lactate (1.9), sCr 0.89. ECG NSR with ?LA enlargement. Xray R foot performed, pending official read,   RLE duplex performed, (28 Aug 2019 04:09)    Podiatry was consulted for right lower extremity achilles tendon abscess and right dorsal foot wound. Patient endorses pain both areas. Reports both wounds have been present for 8 days ago. Admits that the wounds started from scratching the areas. yellow/white drainage from the wounds.     Review of systems negative except per HPI    PAST MEDICAL & SURGICAL HISTORY:  Anxiety  HIV (human immunodeficiency virus infection)  Drug abuse and dependence  Status post incision and drainage    Home Medications:  Bactrim  mg-160 mg oral tablet: 1 tab(s) orally once a day (28 Aug 2019 04:23)  Biktarvy oral tablet: 1 tab(s) orally once a day (28 Aug 2019 04:23)    Allergies  No Known Allergies    Intolerances    FAMILY HISTORY:  Family history of substance abuse: Mother, crack/cocaine  FH: HIV infection: Mother    Social History:       LABS                        12.1   9.01  )-----------( 310      ( 28 Aug 2019 11:02 )             37.6     08-28    140  |  102  |  11  ----------------------------<  69<L>  4.2   |  28  |  0.85    Ca    9.4      28 Aug 2019 02:22    TPro  7.6  /  Alb  3.8  /  TBili  0.2  /  DBili  x   /  AST  33  /  ALT  30  /  AlkPhos  75  08-28  PT/INR - ( 28 Aug 2019 02:22 )   PT: 11.6 sec;   INR: 1.03     PTT - ( 28 Aug 2019 02:22 )  PTT:26.9 sec    Vital Signs Last 24 Hrs  T(C): 36.9 (28 Aug 2019 07:24), Max: 37.2 (28 Aug 2019 01:44)  T(F): 98.5 (28 Aug 2019 07:24), Max: 98.9 (28 Aug 2019 01:44)  HR: 93 (28 Aug 2019 07:24) (93 - 118)  BP: 122/75 (28 Aug 2019 07:24) (122/75 - 139/82)  BP(mean): --  RR: 17 (28 Aug 2019 07:24) (17 - 18)  SpO2: 99% (28 Aug 2019 07:24) (99% - 100%)    PHYSICAL EXAM  General: NAD, AA0x3    Lower Extremity Focused:  Vasc: 2/4 DP/PT pulses. both extremities warm to warm. Nonpitting pedal, perimalleolar and pretibial edema  Derm: Superficial wound 1cm by 2cm dorsum 4th webspace with granular tissue. Superificial abscess lateral to achilles tendon  Neuro:  MSK:     RADIOLOGY Attending: Flori    Patient is a 33y old  Male who presents with a chief complaint of R foot pain (28 Aug 2019 11:10)    HPI:  32 y/o PMHx HIV (sexually contracted, dx 2007, last CD4 360s, VLUD as of May 2019 per patient, on Biktarvy), anxiety presenting to [facility] ED with complaints of R foot pain and swelling for the last 8 days. Patient's history dates back to earlier this month when he was at [facility] inpatient psych for a reported anxiety attack. Patient is vague surrounding details of his inpatient stay. While there, approximately 8 days ago, patient noted have developed R foot pain with associated swelling and redness. He reports telling the staff there at the time about his R foot pain however notes his complaints were disregarded. Denies any trauma to the area. He notes that approximately 2 days ago, he was discharged from inpatient psych, after which he went back to a friend's house to eat/change his clothes. From there, he called 911 to pick him up for his R foot pain. He then brought back to [facility] ED for further evaluation. Patient states there he had imaging done, was given IVF and abxs and had an I&D done on the dorsum of his foot (between the distal 4th and 5th metatarsal bones) and at around the insertion of the achilles tendon where he noted pus came out of the foot. There were no reported beds at [facility] so he requested that he be transfered to another hospital, [facility]. There, he notes he was told he may need surgery for unclear reasons however given the wait time there and his dissatisfaction with the care he was receiving, he left the hospital and came to [facility] for further evaluation. ROS other negative for fevers, chills, NS, chest pain, shortness of breath. Remainder of ROS negative.      ED VS: T 98.9 F; ; /82; RR 18; Sp02 100 RA  ED Course: Labs with no leukocytosis, negative lactate (1.9), sCr 0.89. ECG NSR with ?LA enlargement. Xray R foot performed, pending official read,   RLE duplex performed, (28 Aug 2019 04:09)    Podiatry was consulted for right lower extremity pain due to an abscess located near achilles tendon and dorsum foot wound. Por historian. Relates that both wounds have been present for about 8 days ago. They resulted from incessant scratching of the skin in these areas. Endorses yellow/white drainage from these sites. Denies nausea, emesis, fever, chills. Previous hospitalization at [facility] and other institutions. Left AMA because he was dissatisfied with care.      Review of systems negative except per HPI    PAST MEDICAL & SURGICAL HISTORY:  Anxiety  HIV (human immunodeficiency virus infection)  Drug abuse and dependence  Status post incision and drainage    Home Medications:  Bactrim  mg-160 mg oral tablet: 1 tab(s) orally once a day (28 Aug 2019 04:23)  Biktarvy oral tablet: 1 tab(s) orally once a day (28 Aug 2019 04:23)    Allergies  No Known Allergies    Intolerances    FAMILY HISTORY:  Family history of substance abuse: Mother, crack/cocaine  FH: HIV infection: Mother    Social History:       LABS                        12.1   9.01  )-----------( 310      ( 28 Aug 2019 11:02 )             37.6     08-28    140  |  102  |  11  ----------------------------<  69<L>  4.2   |  28  |  0.85    Ca    9.4      28 Aug 2019 02:22    TPro  7.6  /  Alb  3.8  /  TBili  0.2  /  DBili  x   /  AST  33  /  ALT  30  /  AlkPhos  75  08-28  PT/INR - ( 28 Aug 2019 02:22 )   PT: 11.6 sec;   INR: 1.03     PTT - ( 28 Aug 2019 02:22 )  PTT:26.9 sec    Vital Signs Last 24 Hrs  T(C): 36.9 (28 Aug 2019 07:24), Max: 37.2 (28 Aug 2019 01:44)  T(F): 98.5 (28 Aug 2019 07:24), Max: 98.9 (28 Aug 2019 01:44)  HR: 93 (28 Aug 2019 07:24) (93 - 118)  BP: 122/75 (28 Aug 2019 07:24) (122/75 - 139/82)  BP(mean): --  RR: 17 (28 Aug 2019 07:24) (17 - 18)  SpO2: 99% (28 Aug 2019 07:24) (99% - 100%)    PHYSICAL EXAM  General: NAD, AA0x3    Lower Extremity Focused:  Vasc: 2/4 DP/PT pulses. both extremities warm to warm. Nonpitting pedal, perimalleolar and pretibial edema of right lower extremity  Derm: Superficial wound 1cm by 2cm dorsum 4th webspace with granular tissue - no purulence, malodor, crepitus, -PTB. Superificial abscess lateral to achilles tendon ~ 3-4cm proximal to calcaneus insertion with purulence expressed upon lateral compression. No malodor, crepitus, -PTB.  Neuro: Protective sensation grossly intact bilaterally     RADIOLOGY  < from: CT Lower Extremity w/ IV Cont, Right (08.28.19 @ 05:58) >  IMPRESSION:  Subcutaneous soft tissue infiltration and vascular engorgement   surrounding the ankle and dorsal foot, likely representing cellulitis.   Suspicious for 0.9 cm superficial cutaneous abscess of the midline   posterior ankle. No osseous erosion or soft tissue gas. No fasciitis. No  osteomyelitis.

## 2019-08-28 NOTE — ED ADULT NURSE REASSESSMENT NOTE - NS ED NURSE REASSESS COMMENT FT1
pt. was asking for something to eat and warm blanket during provider eval., was provided with warm blankets and sandwich/juice, assessment on-going

## 2019-08-28 NOTE — H&P ADULT - NSHPLABSRESULTS_GEN_ALL_CORE
.  LABS:                         13.4   9.56  )-----------( 295      ( 28 Aug 2019 02:22 )             42.0     08-28    140  |  102  |  11  ----------------------------<  69<L>  4.2   |  28  |  0.85    Ca    9.4      28 Aug 2019 02:22    TPro  7.6  /  Alb  3.8  /  TBili  0.2  /  DBili  x   /  AST  33  /  ALT  30  /  AlkPhos  75  08-28    PT/INR - ( 28 Aug 2019 02:22 )   PT: 11.6 sec;   INR: 1.03          PTT - ( 28 Aug 2019 02:22 )  PTT:26.9 sec          Lactate, Blood: 1.9 mmoL/L (08-28 @ 02:22)      RADIOLOGY, EKG & ADDITIONAL TESTS: Reviewed.

## 2019-08-28 NOTE — H&P ADULT - ASSESSMENT
34 y/o PMHx HIV (sexually contracted, dx 2007, last CD4 360s, VLUD as of May 2019 per patient, on Biktarvy), anxiety presenting to [facility] ED with complaints of R foot pain and swelling for the last 8 days, with abscesses on dorsum and at achilles insertion, s/p I&D at [facility], being admitted for further medical management.

## 2019-08-29 ENCOUNTER — TRANSCRIPTION ENCOUNTER (OUTPATIENT)
Age: 34
End: 2019-08-29

## 2019-08-29 DIAGNOSIS — F99 MENTAL DISORDER, NOT OTHERWISE SPECIFIED: ICD-10-CM

## 2019-08-29 DIAGNOSIS — F29 UNSPECIFIED PSYCHOSIS NOT DUE TO A SUBSTANCE OR KNOWN PHYSIOLOGICAL CONDITION: ICD-10-CM

## 2019-08-29 DIAGNOSIS — R63.8 OTHER SYMPTOMS AND SIGNS CONCERNING FOOD AND FLUID INTAKE: ICD-10-CM

## 2019-08-29 LAB
N GONORRHOEA RRNA SPEC QL NAA+PROBE: SIGNIFICANT CHANGE UP
SPECIMEN SOURCE: SIGNIFICANT CHANGE UP

## 2019-08-29 PROCEDURE — 99233 SBSQ HOSP IP/OBS HIGH 50: CPT | Mod: GC

## 2019-08-29 PROCEDURE — 99231 SBSQ HOSP IP/OBS SF/LOW 25: CPT

## 2019-08-29 RX ORDER — QUETIAPINE FUMARATE 200 MG/1
25 TABLET, FILM COATED ORAL AT BEDTIME
Refills: 0 | Status: DISCONTINUED | OUTPATIENT
Start: 2019-08-29 | End: 2019-08-30

## 2019-08-29 RX ORDER — HALOPERIDOL DECANOATE 100 MG/ML
5 INJECTION INTRAMUSCULAR ONCE
Refills: 0 | Status: COMPLETED | OUTPATIENT
Start: 2019-08-29 | End: 2019-08-29

## 2019-08-29 RX ORDER — RISPERIDONE 4 MG/1
1 TABLET ORAL
Refills: 0 | Status: DISCONTINUED | OUTPATIENT
Start: 2019-08-29 | End: 2019-08-30

## 2019-08-29 RX ADMIN — Medication 166.67 MILLIGRAM(S): at 02:30

## 2019-08-29 RX ADMIN — HALOPERIDOL DECANOATE 5 MILLIGRAM(S): 100 INJECTION INTRAMUSCULAR at 15:01

## 2019-08-29 RX ADMIN — BICTEGRAVIR SODIUM, EMTRICITABINE, AND TENOFOVIR ALAFENAMIDE FUMARATE 1 TABLET(S): 30; 120; 15 TABLET ORAL at 12:18

## 2019-08-29 RX ADMIN — Medication 2 MILLIGRAM(S): at 14:54

## 2019-08-29 RX ADMIN — AMPICILLIN SODIUM AND SULBACTAM SODIUM 200 GRAM(S): 250; 125 INJECTION, POWDER, FOR SUSPENSION INTRAMUSCULAR; INTRAVENOUS at 12:17

## 2019-08-29 RX ADMIN — AMPICILLIN SODIUM AND SULBACTAM SODIUM 200 GRAM(S): 250; 125 INJECTION, POWDER, FOR SUSPENSION INTRAMUSCULAR; INTRAVENOUS at 05:47

## 2019-08-29 NOTE — DISCHARGE NOTE PROVIDER - NSDCFUADDAPPT_GEN_ALL_CORE_FT
Your podiatric team and your infectious disease team will continue to follow you while you are an inpatient at [facility]'s department of psychiatry

## 2019-08-29 NOTE — PROGRESS NOTE ADULT - ASSESSMENT
A/P 34 y/o PMHx HIV (sexually contracted, dx 2007, last CD4 360s, VLUD as of May 2019 per patient, on Biktarvy), anxiety presenting with right lower extremity abscess near achilles tendon and right dorsum wound at 4th webspace    - C/w IV Abx as per primary team  - F/u abscess wound culture  - Weight bearing as tolerated   - Dry sterile dressing applied to right lower extremity   - Podiatry will follow

## 2019-08-29 NOTE — PROGRESS NOTE ADULT - PROBLEM SELECTOR PLAN 2
H/o HIV since 2007. CD4 count 265 on admission. Patient has  at [facility]. Was previously on Biktarvy and Bactrim. However, patient has not been taking his ART for several weeks due to inability to fill prescription.   -Continue Biktarvy  -Can hold Bactrim as CD4 count >200  -f/u viral load

## 2019-08-29 NOTE — PROGRESS NOTE ADULT - SUBJECTIVE AND OBJECTIVE BOX
Patient is a 33y old  Male who presents with a chief complaint of R foot pain (29 Aug 2019 09:15)    INTERVAL HPI/ OVERNIGHT EVENTS: Pt resting comfortably in bed. Attempted to leave AMA last night. Dressing fell off and replaced by nurse. No acute events overnight.     LABS                        12.1   9.01  )-----------( 310      ( 28 Aug 2019 11:02 )             37.6     08-28    142  |  105  |  9   ----------------------------<  109<H>  4.2   |  25  |  0.84    Ca    9.1      28 Aug 2019 11:02  Mg     1.9     08-28    TPro  7.6  /  Alb  3.8  /  TBili  0.2  /  DBili  x   /  AST  33  /  ALT  30  /  AlkPhos  75  08-28    PT/INR - ( 28 Aug 2019 02:22 )   PT: 11.6 sec;   INR: 1.03       PTT - ( 28 Aug 2019 02:22 )  PTT:26.9 sec    ICU Vital Signs Last 24 Hrs  T(C): 36.8 (29 Aug 2019 08:33), Max: 37.4 (28 Aug 2019 14:53)  T(F): 98.2 (29 Aug 2019 08:33), Max: 99.3 (28 Aug 2019 14:53)  HR: 81 (29 Aug 2019 08:33) (81 - 99)  BP: 122/73 (29 Aug 2019 08:33) (103/50 - 124/73)  BP(mean): --  ABP: --  ABP(mean): --  RR: 16 (29 Aug 2019 08:33) (16 - 17)  SpO2: 98% (29 Aug 2019 08:33) (98% - 99%)    RADIOLOGY  < from: CT Lower Extremity w/ IV Cont, Right (08.28.19 @ 05:58) >    IMPRESSION:  Subcutaneous soft tissue infiltration and vascular engorgement   surrounding the ankle and dorsal foot, likely representing cellulitis.   Suspicious for 0.9 cm superficial cutaneous abscess of the midline   posterior ankle. No osseous erosion or soft tissue gas. No fasciitis. No  osteomyelitis.    MICROBIOLOGY  Culture - Surgical Swab (08.28.19 @ 11:09)    Gram Stain:   Few Gram positive cocci in pairs  Moderate WBC's    Specimen Source: .Surgical Swab Right posterior abscess    Lower Extremity Focused:  No interval change in neurovascular status. Pedal and perimalleolar edema significantly improved compared to yesterday. Superficial wound 1cm by 2cm dorsum 4th webspace with granular tissue - no purulence, malodor, crepitus, -PTB. Superificial abscess lateral to achilles tendon ~ 3-4cm proximal to calcaneus insertion - No purulent drainage today, malodor, crepitus, -PTB. Protective sensation grossly intact bilaterally

## 2019-08-29 NOTE — CHART NOTE - NSCHARTNOTEFT_GEN_A_CORE
Notified by RN that patient running in hallway, shouting, and trying to leave against medical advice. Patient had made statements to nursing that people in his hospital room were listening to him, that people were trying to kill him, and that he was leaving the hospital.     Assessed patient in hallway. Security was notified. Patient was persuaded not to leave unit and to sit down. Made statements to MD including "I don't have much to lose" and stated that he was not being treated well by hospital staff and that he wanted to leave because of this. His exact concern could not be clarified. He continued to shout and threaten to leave and at times stood up and started trying to leave, again dissuaded actively by social work, nursing and house staff.     Patient was amenable to have Ativan pushed via IV. Pushed Ativan 2 mg. Once he was more calm, Haldol 5 mg was pushed. A few minutes later, patient stated he wanted to lie down. He returned to his room.    Order for constant observation was placed. Aspiration precautions were maintained but the patient was noted to be breathing comfortably at a rate of 14 at rest, protecting airway and still responding to verbal commands.     Psychiatry was called to update them, Dr. Moreno. They will see patient urgently and agreed with plan to give Ativan and/or Haldol as needed to permit medical management of patient until he can be evaluated by psych for capacity.     Dr. Junior medicine attending made aware.

## 2019-08-29 NOTE — DISCHARGE NOTE PROVIDER - PROVIDER TOKENS
PROVIDER:[TOKEN:[[id number]],FOLLOWUP:[2 weeks]],PROVIDER:[TOKEN:[[id number]],FOLLOWUP:[2 weeks]]

## 2019-08-29 NOTE — PROGRESS NOTE ADULT - PROBLEM SELECTOR PLAN 1
CT consistent with cellulitis and abscess of R foot with no evidence of fasciitis or osteomyelitis. s/p I&D and unclear abx at OSH ED. Patient remains afebrile w/ normal WBC count. Nontoxic appearing.  -Continue vancomycin 1250mg q12h  -Vanco trough 1pm today  -Continue unasyn 3g q6h  -f/u blood cx  -f/u wound cx  -Podiatry consult CT consistent with cellulitis and abscess of R foot with no evidence of fasciitis or osteomyelitis. s/p I&D and unclear abx at OSH ED. Patient remains afebrile w/ normal WBC count. Nontoxic appearing.  -Podiatry consulted, appreciate recs  -Continue vancomycin 1250mg q12h  -Vanco trough 1pm today  -Continue unasyn 3g q6h  -f/u blood cx  -f/u wound cx  -Weight bearing as tolerated CT consistent with cellulitis and abscess of R foot with no evidence of fasciitis or osteomyelitis. s/p I&D and unclear abx at OSH ED. Patient remains afebrile w/ normal WBC count. Nontoxic appearing.  -Podiatry consulted, appreciate recs  -Continue vancomycin 1250mg q12h  -Vanco trough 1pm today  -Discontinue unasyn 3g q6h as wound culture showing MRSA  -f/u blood cx  -Weight bearing as tolerated

## 2019-08-29 NOTE — DISCHARGE NOTE PROVIDER - HOSPITAL COURSE
Patient is __ yo M/F with past medical history of _____    Presented with _____, found to have _____        Problem List/Main Diagnoses (system-based):         Inpatient treatment course:         New medications:     Labs to be followed outpatient:     Exam to be followed outpatient: Mr. Zelaya is a 33 year old male w/ pmh significant for HIV (diagnosed 2007, current CD4 count 265), current IVDU (last 3 weeks ago), and psychiatric history (on risperidone).        Presented to the ED with R foot ulcer, swelling, and pain and found to have purulent cellulitis.         Problem List/Main Diagnoses (system-based):     R Foot Cellulitis and Abscess: Presented to [facility] ED after d/c from psych admission and underwent I&D. Left AMA after receiving unclear iv abx. CT of the foot showed cellulitis and abscess without fasciitis or osteomyelitis. In our ED, patient underwent I&D x2 with podiatry. Cultures are being followed. Started empirically on vancomycin and unasyn. Podiatry following. Weight bearing as tolerated. Patient nontoxic appearing, afebrile throughout admission and normal WBC count.    HIV: Hx of HIV diagnosed in 2007. CD4 on admission was 265. Previously on Biktarvy however patient unable to obtain recent prescription and has been off of ART for several weeks. Restarted Biktarvy on admission. No need for Bactrim as CD4>200.    Drug abuse: Current injector of methamphetamine, last used 1 week PTA. Injects into arms only per patient. Also smokes marijuana. UTox on admission was clean. Social work was consulted.    Current tobacco use: Smokes up to 1ppd. Nicotine gum while admitted. Did not want a patch during admission.    Psychiatric hx: Recent admission to [facility] psych unit for unclear reasons. Discharged on Risperdal 2mg daily. Has a  at [facility]. Has a history of violence.    Hx of high risk sexual behavior: Multiple female partners in last 6 months with inconsistent barrier protection use. Asked for STI testing while admitted.        Inpatient treatment course: Presented to [facility] ED after d/c from psych admission and underwent I&D. Left AMA after receiving unclear iv abx. In our ED, patient underwent I&D x2 with podiatry. Patient nontoxic appearing, afebrile on admission and normal WBC count. Cultures are being followed. Started empirically on vancomycin and unasyn. Podiatry following. Weight bearing as tolerated. Psychiatry consulted for drug use/hx of recent psych admissions and medication management. Received dose of Seroquel 8/28 overnight due to agitation.         New medications:         Labs to be followed outpatient:     Exam to be followed outpatient: Mr. Zelaya is a 33 year old male w/ pmh significant for HIV (diagnosed 2007, current CD4 count 265), current IVDU (last 3 weeks ago), and psychiatric history (on risperidone).        Presented to the ED with R foot ulcer, swelling, and pain and found to have purulent cellulitis.         Problem List/Main Diagnoses (system-based):     R Foot Cellulitis and Abscess: Presented to [facility] ED after d/c from psych admission and underwent I&D. Left AMA after receiving unclear iv abx. CT of the foot showed cellulitis and abscess without fasciitis or osteomyelitis. In our ED, patient underwent I&D x2 with podiatry. Cultures are being followed. Started empirically on vancomycin and unasyn. Podiatry following. Weight bearing as tolerated. Patient nontoxic appearing, afebrile throughout admission and normal WBC count.    HIV: Hx of HIV diagnosed in 2007. CD4 on admission was 265. Previously on Biktarvy however patient unable to obtain recent prescription and has been off of ART for several weeks. Restarted Biktarvy on admission. No need for Bactrim as CD4>200.    Drug abuse: Current injector of methamphetamine, last used 1 week PTA. Injects into arms only per patient. Also smokes marijuana. UTox on admission was clean. Social work was consulted.    Current tobacco use: Smokes up to 1ppd. Nicotine gum while admitted. Did not want a patch during admission.    Psychiatric hx: Recent admission to [facility] psych unit for unclear reasons. Discharged on Risperdal 2mg daily. Has a  at [facility]. Has a history of violence.    Hx of high risk sexual behavior: Multiple female partners in last 6 months with inconsistent barrier protection use. Asked for STI testing while admitted.        Inpatient treatment course: Presented to [facility] ED after d/c from psych admission and underwent I&D. Left AMA after receiving unclear iv abx. In our ED, patient underwent I&D x2 with podiatry. Patient nontoxic appearing, afebrile on admission and normal WBC count. Wound was cultured. Started empirically on vancomycin and unasyn. Podiatry following. Weight bearing as tolerated. Psychiatry consulted for drug use/hx of recent psych admissions and medication management. Received dose of Seroquel 8/28 overnight due to agitation. Admission complicated by multiple attempts at elopement. Received prn doses of haldol 5mg and ativan 2mg for agitation and was placed on a 1:1 to prevent elopement. Wound cultures grew MRSA. Vancomycin continued, unasyn d/c'ed. May require transfer to inpatient psych pending medical issues.        New medications:         Labs to be followed outpatient:     Exam to be followed outpatient: Mr. Zelaya is a 33 year old male w/ pmh significant for HIV (diagnosed 2007, current CD4 count 265), current IVDU (last 3 weeks ago), and psychiatric history (on risperidone).        Presented to the ED with R foot ulcer, swelling, and pain and found to have purulent cellulitis.         Problem List/Main Diagnoses (system-based):     R Foot Cellulitis and Abscess: Presented to [facility] ED after d/c from psych admission and underwent I&D. Left AMA after receiving unclear iv abx. CT of the foot showed cellulitis and abscess without fasciitis or osteomyelitis. In our ED, patient underwent I&D x2 with podiatry. Cultures are being followed. Started empirically on vancomycin and unasyn. Podiatry following. Weight bearing as tolerated. Patient nontoxic appearing, afebrile throughout admission and normal WBC count. Abx were changed to bactrim per podiatry based on sensitivities for 2 weeks.    HIV: Hx of HIV diagnosed in 2007. CD4 on admission was 265. Previously on Biktarvy however patient unable to obtain recent prescription and has been off of ART for several weeks. Restarted Biktarvy on admission. No need for Bactrim as CD4>200.    Drug abuse: Current injector of methamphetamine, last used 1 week PTA. Injects into arms only per patient. Also smokes marijuana. UTox on admission was clean. Social work was consulted.    Current tobacco use: Smokes up to 1ppd. Nicotine gum while admitted. Did not want a patch during admission.    Psychiatric hx: Recent admission to [facility] psych unit for unclear reasons. Discharged on Risperdal 2mg daily. Has a  at [facility]. Has a history of violence.    Hx of high risk sexual behavior: Multiple female partners in last 6 months with inconsistent barrier protection use. Asked for STI testing while admitted.        Inpatient treatment course: Presented to [facility] ED after d/c from psych admission and underwent I&D. Left AMA after receiving unclear iv abx. In our ED, patient underwent I&D x2 with podiatry. Patient nontoxic appearing, afebrile on admission and normal WBC count. Wound was cultured. Started empirically on vancomycin and unasyn. Podiatry following. Weight bearing as tolerated. Psychiatry consulted for drug use/hx of recent psych admissions and medication management. Received dose of Seroquel 8/28 overnight due to agitation. Admission complicated by multiple attempts at elopement. Received prn doses of haldol 5mg and ativan 2mg for agitation and was placed on a 1:1 to prevent elopement. Wound cultures grew MRSA. Vancomycin continued, unasyn d/c'ed. May require transfer to inpatient psych pending medical issues.        New medications:     Bactrim DS po bid for 14 days        Labs to be followed outpatient:     Exam to be followed outpatient: Mr. Zelaya is a 33 year old male w/ pmh significant for HIV (diagnosed 2007, current CD4 count 265), current IVDU (last 3 weeks ago), and psychiatric history (on risperidone).        Presented to the ED with R foot ulcer, swelling, and pain and found to have purulent cellulitis.         Problem List/Main Diagnoses (system-based):     R Foot Cellulitis and Abscess: Presented to [facility] ED after d/c from psych admission and underwent I&D. Left AMA after receiving unclear iv abx. CT of the foot showed cellulitis and abscess without fasciitis or osteomyelitis. In our ED, patient underwent I&D x2 with podiatry. Cultures are being followed. Started empirically on vancomycin and unasyn. Podiatry following. Weight bearing as tolerated. Patient nontoxic appearing, afebrile throughout admission and normal WBC count. Abx were changed to bactrim per podiatry based on sensitivities for 2 weeks.    HIV: Hx of HIV diagnosed in 2007. CD4 on admission was 265. Previously on Biktarvy however patient unable to obtain recent prescription and has been off of ART for several weeks. Restarted Biktarvy on admission. No need for Bactrim as CD4>200.    Drug abuse: Current injector of methamphetamine, last used 1 week PTA. Injects into arms only per patient. Also smokes marijuana. UTox on admission was clean. Social work was consulted.    Current tobacco use: Smokes up to 1ppd. Nicotine gum while admitted. Did not want a patch during admission.    Psychiatric hx: Recent admission to [facility] psych unit for unclear reasons. Discharged on Risperdal 2mg daily. Has a  at [facility]. Has a history of violence.    Hx of high risk sexual behavior: Multiple female partners in last 6 months with inconsistent barrier protection use. Asked for STI testing while admitted.        Inpatient treatment course: Presented to [facility] ED after d/c from psych admission and underwent I&D. Left AMA after receiving unclear iv abx. In our ED, patient underwent I&D x2 with podiatry. Patient nontoxic appearing, afebrile on admission and normal WBC count. Wound was cultured. Started empirically on vancomycin and unasyn. Podiatry following. Weight bearing as tolerated. Psychiatry consulted for drug use/hx of recent psych admissions and medication management. Received dose of Seroquel 8/28 overnight due to agitation. Admission complicated by multiple attempts at elopement. Received prn doses of haldol 5mg and ativan 2mg for agitation and was placed on a 1:1 to prevent elopement. Wound cultures grew MRSA. Vancomycin continued, unasyn d/c'ed. May require transfer to inpatient psych pending medical issues.        New medications:     Bactrim DS po bid for 14 days

## 2019-08-29 NOTE — PROGRESS NOTE ADULT - ASSESSMENT
33M w/ pmh significant for HIV (diagnosed 2007, current CD4 count 265), current IVDU (last 3 weeks ago), and psychiatric history (on risperidone) who presented to the ED with R foot ulcer, swelling, and lesions and found to have purulent cellulitis. Now, s/p I&D and currently on Vancomycin and Unasyn.

## 2019-08-29 NOTE — PROGRESS NOTE ADULT - PROBLEM SELECTOR PLAN 4
Patient had recent admission to [facility] psych unit for 6 days and is reportedly on risperdal 2mg at home. Unclear of his diagnosis hypomania versus drug induced psychosis versus bipolar.  -SW consult  -Psychiatry consulted, f/u recs  -Need collateral from [facility] admission

## 2019-08-29 NOTE — PROGRESS NOTE ADULT - PROBLEM SELECTOR PLAN 5
Endorsing 4+ female sexual partners within last 6 months and inconsistent barrier protection use. Inguinal LAD on admission PE. Would like full STI w/u.  -f/u RPR and G/C. Endorsing 4+ sexual partners within last 6 months and inconsistent barrier protection use. Inguinal LAD on admission PE. Would like full STI w/u.  -f/u RPR and G/C.

## 2019-08-29 NOTE — DISCHARGE NOTE PROVIDER - CARE PROVIDER_API CALL
Zahra Worley)  Internal Medicine  [address] [address]  Phone: [phone]  Fax: [phone]  Follow Up Time: 2 weeks    Evelyn Ruby)  Orthopaedic Surgery  [address] [place] [address]  Phone: [phone]  Fax: [phone]  Follow Up Time: 2 weeks

## 2019-08-29 NOTE — DISCHARGE NOTE PROVIDER - NSDCCPCAREPLAN_GEN_ALL_CORE_FT
PRINCIPAL DISCHARGE DIAGNOSIS  Diagnosis: Cellulitis and abscess of foot  Assessment and Plan of Treatment: You came to the hospital with pain and swelling of your right foot. In the ED, you were seen by podiatry who drained multiple abscesses of your foot and cultured the wounds for bacterial growth. We started you empirically on vancomycin and unasyn intravenously. A CAT scan revealed no involvement of the underlying muscle or bone. Please obtain follow up with podiatry in 1 week following discharge regarding management of your ongoing foot infection.      SECONDARY DISCHARGE DIAGNOSES  Diagnosis: HIV (human immunodeficiency virus infection)  Assessment and Plan of Treatment: You have a history of HIV. On admission, your CD4 count was 265. You recently stopped taking your Biktarvy due to inability to obtain the prescription. We restarted this medication while you were in the hospital. You no longer need to take Bactrim while your CD4 count is above 200. Please follow up with your primary care or infectious disease physician in 1-2 weeks after discharge to discuss ongoing management of your HIV.    Diagnosis: Drug abuse and dependence  Assessment and Plan of Treatment: Drug abuse and dependence    Diagnosis: High risk sexual behavior  Assessment and Plan of Treatment: You had palpable lymph nodes in your groin on admission and asked to be checked for sexually transmitted infections. PRINCIPAL DISCHARGE DIAGNOSIS  Diagnosis: Cellulitis and abscess of foot  Assessment and Plan of Treatment: You came to the hospital with pain and swelling of your right foot. In the ED, you were seen by podiatry who drained multiple abscesses of your foot and cultured the wounds for bacterial growth. We started you empirically on vancomycin and unasyn intravenously. A CAT scan revealed no involvement of the underlying muscle or bone. The wound culture from your foot grew MRSA. We continued treating you with IV vancomycin. Please obtain follow up with podiatry in 1 week following discharge regarding management of your ongoing foot infection.      SECONDARY DISCHARGE DIAGNOSES  Diagnosis: HIV (human immunodeficiency virus infection)  Assessment and Plan of Treatment: You have a history of HIV. On admission, your CD4 count was 265. You recently stopped taking your Biktarvy due to inability to obtain the prescription. We restarted this medication while you were in the hospital. You no longer need to take Bactrim while your CD4 count is above 200. Please follow up with your primary care or infectious disease physician in 1-2 weeks after discharge to discuss ongoing management of your HIV.    Diagnosis: Drug abuse and dependence  Assessment and Plan of Treatment: You have a history of polysubstance abuse including methamphetamine and marijuana. During your hospitalization you became agitated several times and attempted to elope from the hospital against medical advice. We treated your agitation with ativan and haldol. Psychiatry saw you while you were in the hospital and recommended we start risperdal 1mg twice a day.    Diagnosis: High risk sexual behavior  Assessment and Plan of Treatment: You had palpable lymph nodes in your groin on admission and asked to be checked for sexually transmitted infections. Your gonorrhea and chlamydia studies were negative. However, your syphilis antibody study was positive. PRINCIPAL DISCHARGE DIAGNOSIS  Diagnosis: Cellulitis and abscess of foot  Assessment and Plan of Treatment: You came to the hospital with pain and swelling of your right foot. In the ED, you were seen by podiatry who drained multiple abscesses of your foot and cultured the wounds for bacterial growth. We started you empirically on vancomycin and unasyn intravenously. A CAT scan revealed no involvement of the underlying muscle or bone. The wound culture from your foot grew MRSA. We continued treating you with IV vancomycin. When the sensitivities came back, we changed your antibiotics to oral bactrim. Continue bactrim for 14 days. Please obtain follow up with podiatry in 1 week following discharge regarding management of your ongoing foot infection.      SECONDARY DISCHARGE DIAGNOSES  Diagnosis: HIV (human immunodeficiency virus infection)  Assessment and Plan of Treatment: You have a history of HIV. On admission, your CD4 count was 265. You recently stopped taking your Biktarvy due to inability to obtain the prescription. We restarted this medication while you were in the hospital. You no longer need to take Bactrim while your CD4 count is above 200. Please follow up with your primary care or infectious disease physician in 1-2 weeks after discharge to discuss ongoing management of your HIV.    Diagnosis: Drug abuse and dependence  Assessment and Plan of Treatment: You have a history of polysubstance abuse including methamphetamine and marijuana. During your hospitalization you became agitated several times and attempted to elope from the hospital against medical advice. We treated your agitation with ativan and haldol. Psychiatry saw you while you were in the hospital and recommended we start risperdal 1mg twice a day.    Diagnosis: High risk sexual behavior  Assessment and Plan of Treatment: You had palpable lymph nodes in your groin on admission and asked to be checked for sexually transmitted infections. Your gonorrhea and chlamydia studies were negative. However, your syphilis antibody study was positive.

## 2019-08-29 NOTE — PROGRESS NOTE ADULT - SUBJECTIVE AND OBJECTIVE BOX
OVERNIGHT EVENTS:  Patient upset overnight about being in the hospital and attempted to leave AMA. Nursing and security to bedside and patient agreed to stay for iv abx.  SUBJECTIVE:  Patient seen and examined at bedside. No complaints this am. States his R foot pain is almost completely gone. He is anxious about being in the hospital because of things he has to do at home and an unclear social stressor. He is also upset about his health information being made public while in the hospital. His ROS was negative except for mild headache. Denied CP, SOB, abdominal pain, rash, NVD, changes in urination or bowel habits, fever or chills.     Vital Signs Last 12 Hrs  T(F): 98.2 (08-29-19 @ 08:33), Max: 98.5 (08-29-19 @ 05:07)  HR: 81 (08-29-19 @ 08:33) (81 - 99)  BP: 122/73 (08-29-19 @ 08:33) (122/73 - 123/74)  BP(mean): --  RR: 16 (08-29-19 @ 08:33) (16 - 17)  SpO2: 98% (08-29-19 @ 08:33) (98% - 99%)  I&O's Summary    28 Aug 2019 07:01  -  29 Aug 2019 07:00  --------------------------------------------------------  IN: 750 mL / OUT: 1900 mL / NET: -1150 mL        PHYSICAL EXAM:    Constitutional: NAD, AAOx3, uncomfortable in bed and anxious appearing  HEENT: MMM, PERRLA, EOMI, anicteric, tongue protrudes midline with fasciculations  Neck: Supple, no JVD, no LAD   Respiratory: Normal rate, rhythm, depth, effort. CTAB. No w/r/r.   Cardiovascular: RRR, normal S1 and S2, no m/r/g.   Gastrointestinal: +BS, soft NTND, no palpable masses  Extremities: R foot wrapped with gauze, no drainage observed, minimal foul odor, LEs are nonedematous and equal in size and warmth, palpable pulses b/l  Neurological: Cranial nerves grossly intact, strength and sensation intact throughout   Psychiatric: Speech slightly pressured, tangential, nervous appearing, denies SI/HI at present         LABS:                        12.1   9.01  )-----------( 310      ( 28 Aug 2019 11:02 )             37.6     08-28    142  |  105  |  9   ----------------------------<  109<H>  4.2   |  25  |  0.84    Ca    9.1      28 Aug 2019 11:02  Mg     1.9     08-28    TPro  7.6  /  Alb  3.8  /  TBili  0.2  /  DBili  x   /  AST  33  /  ALT  30  /  AlkPhos  75  08-28    PT/INR - ( 28 Aug 2019 02:22 )   PT: 11.6 sec;   INR: 1.03          PTT - ( 28 Aug 2019 02:22 )  PTT:26.9 sec      RADIOLOGY & ADDITIONAL TESTS:    MEDICATIONS  (STANDING):  ampicillin/sulbactam  IVPB 3 Gram(s) IV Intermittent every 6 hours  bictegravir 50 mG/emtricitabine 200 mG/tenofovir alafenamide 25 mG (BIKTARVY) 1 Tablet(s) Oral daily  vancomycin  IVPB 1250 milliGRAM(s) IV Intermittent every 12 hours    MEDICATIONS  (PRN):  haloperidol    Injectable 5 milliGRAM(s) IV Push every 8 hours PRN Acute agitation  LORazepam   Injectable 2 milliGRAM(s) IntraMuscular every 8 hours PRN Agitation  nicotine  Polacrilex Gum 2 milliGRAM(s) Oral every 3 hours PRN Nicotine craving

## 2019-08-29 NOTE — DISCHARGE NOTE PROVIDER - CARE PROVIDERS DIRECT ADDRESSES
[email or web address]

## 2019-08-30 ENCOUNTER — INPATIENT (INPATIENT)
Facility: HOSPITAL | Age: 34
LOS: 11 days | Discharge: HOME | DRG: 885 | End: 2019-09-11
Attending: PSYCHIATRY & NEUROLOGY | Admitting: PSYCHIATRY & NEUROLOGY
Payer: MEDICAID

## 2019-08-30 VITALS
SYSTOLIC BLOOD PRESSURE: 135 MMHG | WEIGHT: 160.06 LBS | HEIGHT: 71 IN | RESPIRATION RATE: 18 BRPM | OXYGEN SATURATION: 100 % | HEART RATE: 106 BPM | DIASTOLIC BLOOD PRESSURE: 80 MMHG

## 2019-08-30 VITALS
TEMPERATURE: 98 F | RESPIRATION RATE: 18 BRPM | DIASTOLIC BLOOD PRESSURE: 70 MMHG | OXYGEN SATURATION: 100 % | SYSTOLIC BLOOD PRESSURE: 107 MMHG | HEART RATE: 90 BPM

## 2019-08-30 DIAGNOSIS — Z98.890 OTHER SPECIFIED POSTPROCEDURAL STATES: Chronic | ICD-10-CM

## 2019-08-30 LAB
ANION GAP SERPL CALC-SCNC: 10 MMOL/L — SIGNIFICANT CHANGE UP (ref 5–17)
BASOPHILS # BLD AUTO: 0.06 K/UL — SIGNIFICANT CHANGE UP (ref 0–0.2)
BASOPHILS NFR BLD AUTO: 0.9 % — SIGNIFICANT CHANGE UP (ref 0–2)
BUN SERPL-MCNC: 13 MG/DL — SIGNIFICANT CHANGE UP (ref 7–23)
CALCIUM SERPL-MCNC: 9.2 MG/DL — SIGNIFICANT CHANGE UP (ref 8.4–10.5)
CHLORIDE SERPL-SCNC: 104 MMOL/L — SIGNIFICANT CHANGE UP (ref 96–108)
CO2 SERPL-SCNC: 28 MMOL/L — SIGNIFICANT CHANGE UP (ref 22–31)
CREAT SERPL-MCNC: 0.88 MG/DL — SIGNIFICANT CHANGE UP (ref 0.5–1.3)
EOSINOPHIL # BLD AUTO: 0.22 K/UL — SIGNIFICANT CHANGE UP (ref 0–0.5)
EOSINOPHIL NFR BLD AUTO: 3.5 % — SIGNIFICANT CHANGE UP (ref 0–6)
GLUCOSE SERPL-MCNC: 96 MG/DL — SIGNIFICANT CHANGE UP (ref 70–99)
HCT VFR BLD CALC: 41.9 % — SIGNIFICANT CHANGE UP (ref 39–50)
HGB BLD-MCNC: 13.2 G/DL — SIGNIFICANT CHANGE UP (ref 13–17)
LYMPHOCYTES # BLD AUTO: 1.95 K/UL — SIGNIFICANT CHANGE UP (ref 1–3.3)
LYMPHOCYTES # BLD AUTO: 31.6 % — SIGNIFICANT CHANGE UP (ref 13–44)
MAGNESIUM SERPL-MCNC: 1.9 MG/DL — SIGNIFICANT CHANGE UP (ref 1.6–2.6)
MCHC RBC-ENTMCNC: 29.3 PG — SIGNIFICANT CHANGE UP (ref 27–34)
MCHC RBC-ENTMCNC: 31.5 GM/DL — LOW (ref 32–36)
MCV RBC AUTO: 92.9 FL — SIGNIFICANT CHANGE UP (ref 80–100)
MONOCYTES # BLD AUTO: 1.14 K/UL — HIGH (ref 0–0.9)
MONOCYTES NFR BLD AUTO: 18.4 % — HIGH (ref 2–14)
NEUTROPHILS # BLD AUTO: 2.17 K/UL — SIGNIFICANT CHANGE UP (ref 1.8–7.4)
NEUTROPHILS NFR BLD AUTO: 34.2 % — LOW (ref 43–77)
PLATELET # BLD AUTO: 379 K/UL — SIGNIFICANT CHANGE UP (ref 150–400)
POTASSIUM SERPL-MCNC: 4.3 MMOL/L — SIGNIFICANT CHANGE UP (ref 3.5–5.3)
POTASSIUM SERPL-SCNC: 4.3 MMOL/L — SIGNIFICANT CHANGE UP (ref 3.5–5.3)
RBC # BLD: 4.51 M/UL — SIGNIFICANT CHANGE UP (ref 4.2–5.8)
RBC # FLD: 13 % — SIGNIFICANT CHANGE UP (ref 10.3–14.5)
SODIUM SERPL-SCNC: 142 MMOL/L — SIGNIFICANT CHANGE UP (ref 135–145)
WBC # BLD: 6.17 K/UL — SIGNIFICANT CHANGE UP (ref 3.8–10.5)
WBC # FLD AUTO: 6.17 K/UL — SIGNIFICANT CHANGE UP (ref 3.8–10.5)

## 2019-08-30 PROCEDURE — 99239 HOSP IP/OBS DSCHRG MGMT >30: CPT | Mod: GC

## 2019-08-30 PROCEDURE — 99233 SBSQ HOSP IP/OBS HIGH 50: CPT

## 2019-08-30 RX ORDER — HALOPERIDOL DECANOATE 100 MG/ML
5 INJECTION INTRAMUSCULAR
Refills: 0 | Status: DISCONTINUED | OUTPATIENT
Start: 2019-08-30 | End: 2019-09-11

## 2019-08-30 RX ORDER — HALOPERIDOL DECANOATE 100 MG/ML
5 INJECTION INTRAMUSCULAR
Qty: 0 | Refills: 0 | DISCHARGE
Start: 2019-08-30

## 2019-08-30 RX ORDER — CHLORHEXIDINE GLUCONATE 213 G/1000ML
1 SOLUTION TOPICAL DAILY
Refills: 0 | Status: DISCONTINUED | OUTPATIENT
Start: 2019-08-30 | End: 2019-08-30

## 2019-08-30 RX ORDER — PENICILLIN G BENZATHINE 1200000 [IU]/2ML
2.4 INJECTION, SUSPENSION INTRAMUSCULAR
Refills: 0 | Status: DISCONTINUED | OUTPATIENT
Start: 2019-09-06 | End: 2019-09-11

## 2019-08-30 RX ORDER — AZTREONAM 2 G
1 VIAL (EA) INJECTION
Qty: 0 | Refills: 0 | DISCHARGE

## 2019-08-30 RX ORDER — NICOTINE POLACRILEX 2 MG
1 GUM BUCCAL
Qty: 0 | Refills: 0 | DISCHARGE
Start: 2019-08-30

## 2019-08-30 RX ORDER — RISPERIDONE 4 MG/1
1 TABLET ORAL
Qty: 0 | Refills: 0 | DISCHARGE
Start: 2019-08-30

## 2019-08-30 RX ORDER — NICOTINE POLACRILEX 2 MG
2 GUM BUCCAL
Refills: 0 | Status: DISCONTINUED | OUTPATIENT
Start: 2019-08-30 | End: 2019-09-11

## 2019-08-30 RX ORDER — CHLORHEXIDINE GLUCONATE 213 G/1000ML
1 SOLUTION TOPICAL
Qty: 0 | Refills: 0 | DISCHARGE
Start: 2019-08-30

## 2019-08-30 RX ORDER — ACETAMINOPHEN 500 MG
650 TABLET ORAL EVERY 6 HOURS
Refills: 0 | Status: DISCONTINUED | OUTPATIENT
Start: 2019-08-30 | End: 2019-09-11

## 2019-08-30 RX ORDER — NICOTINE POLACRILEX 2 MG
4 GUM BUCCAL
Refills: 0 | Status: DISCONTINUED | OUTPATIENT
Start: 2019-08-30 | End: 2019-08-30

## 2019-08-30 RX ORDER — HYDROXYZINE HCL 10 MG
25 TABLET ORAL
Refills: 0 | Status: DISCONTINUED | OUTPATIENT
Start: 2019-08-30 | End: 2019-09-11

## 2019-08-30 RX ORDER — TRAZODONE HCL 50 MG
50 TABLET ORAL AT BEDTIME
Refills: 0 | Status: DISCONTINUED | OUTPATIENT
Start: 2019-08-30 | End: 2019-09-11

## 2019-08-30 RX ORDER — RISPERIDONE 4 MG/1
1 TABLET ORAL
Refills: 0 | Status: DISCONTINUED | OUTPATIENT
Start: 2019-08-30 | End: 2019-09-02

## 2019-08-30 RX ORDER — PENICILLIN G POTASSIUM 5000000 [IU]/1
2.4 POWDER, FOR SOLUTION INTRAMUSCULAR; INTRAPLEURAL; INTRATHECAL; INTRAVENOUS ONCE
Refills: 0 | Status: COMPLETED | OUTPATIENT
Start: 2019-08-30 | End: 2019-08-30

## 2019-08-30 RX ORDER — BICTEGRAVIR SODIUM, EMTRICITABINE, AND TENOFOVIR ALAFENAMIDE FUMARATE 30; 120; 15 MG/1; MG/1; MG/1
1 TABLET ORAL DAILY
Refills: 0 | Status: DISCONTINUED | OUTPATIENT
Start: 2019-08-30 | End: 2019-09-06

## 2019-08-30 RX ORDER — PENICILLIN G BENZATHINE 1200000 [IU]/2ML
2.4 INJECTION, SUSPENSION INTRAMUSCULAR
Refills: 0 | Status: CANCELLED | OUTPATIENT
Start: 2019-09-06 | End: 2019-08-30

## 2019-08-30 RX ORDER — CHLORHEXIDINE GLUCONATE 213 G/1000ML
1 SOLUTION TOPICAL DAILY
Refills: 0 | Status: DISCONTINUED | OUTPATIENT
Start: 2019-08-30 | End: 2019-09-06

## 2019-08-30 RX ORDER — QUETIAPINE FUMARATE 200 MG/1
1 TABLET, FILM COATED ORAL
Qty: 0 | Refills: 0 | DISCHARGE
Start: 2019-08-30

## 2019-08-30 RX ADMIN — Medication 650 MILLIGRAM(S): at 21:54

## 2019-08-30 RX ADMIN — Medication 2 MILLIGRAM(S): at 21:53

## 2019-08-30 RX ADMIN — PENICILLIN G POTASSIUM 100 MILLION UNIT(S): 5000000 POWDER, FOR SOLUTION INTRAMUSCULAR; INTRAPLEURAL; INTRATHECAL; INTRAVENOUS at 14:31

## 2019-08-30 RX ADMIN — CHLORHEXIDINE GLUCONATE 1 APPLICATION(S): 213 SOLUTION TOPICAL at 13:23

## 2019-08-30 RX ADMIN — BICTEGRAVIR SODIUM, EMTRICITABINE, AND TENOFOVIR ALAFENAMIDE FUMARATE 1 TABLET(S): 30; 120; 15 TABLET ORAL at 13:24

## 2019-08-30 RX ADMIN — Medication 1 TABLET(S): at 21:53

## 2019-08-30 RX ADMIN — Medication 1 TABLET(S): at 13:23

## 2019-08-30 RX ADMIN — Medication 50 MILLIGRAM(S): at 21:53

## 2019-08-30 RX ADMIN — RISPERIDONE 1 MILLIGRAM(S): 4 TABLET ORAL at 21:53

## 2019-08-30 RX ADMIN — Medication 650 MILLIGRAM(S): at 22:55

## 2019-08-30 NOTE — CHART NOTE - NSCHARTNOTEFT_GEN_A_CORE
Discussed with epidemiology on call. Per Sally Tubbs, the contact order does not need to be discontinued for patient to be transferred to [facility]. While strict contact precautions are routinely observed on [facility] as this is an acute care medical unit, as an acute care psychiatric unit, the precautions as they should be observed on [facility] for a patient with MRSA-positive wound culture results are handwashing, maintaining a clean bandage and socks over the wound site as per podiatry's recommendations, and for nursing to be aware of patient's diagnosis.

## 2019-08-30 NOTE — CONSULT NOTE ADULT - SUBJECTIVE AND OBJECTIVE BOX
HPI:  32 y/o PMHx HIV (sexually contracted, dx 2007, last CD4 360s, VLUD as of May 2019 per patient, on Biktarvy), anxiety presenting to [facility] ED with complaints of R foot pain and swelling for the last 8 days. Patient's history dates back to earlier this month when he was at [facility] inpatient psych for a reported anxiety attack. Approximately 2 days ago, he was discharged from inpatient psych, after which he went back to a friend's house. Brought back to [facility] ED for further evaluation. Patient states there he had imaging done, was given IVF and abxs and had an I&D done on the dorsum of his foot (between the distal 4th and 5th metatarsal bones) and at around the insertion of the achilles tendon where he noted pus came out of the foot, being treated with bactrim at [facility]    States that he was dx with HIV due to high risk behavior, last iv meth use 2 weeks ago, last time tx for syphilis possibly 1 year ago but patient became agitated at questions saying "talk to my doctor." Was given biktarvy and bactrim by pcp and started this 6 months ago.       Vital Signs Last 24 Hrs  T(C): 36.8 (30 Aug 2019 16:42), Max: 36.8 (30 Aug 2019 16:42)  T(F): 98.3 (30 Aug 2019 16:42), Max: 98.3 (30 Aug 2019 16:42)  HR: 90 (30 Aug 2019 16:42) (84 - 103)  BP: 107/70 (30 Aug 2019 16:42) (107/70 - 120/78)  BP(mean): --  RR: 18 (30 Aug 2019 16:42) (17 - 18)  SpO2: 100% (30 Aug 2019 16:42) (98% - 100%)    PHYSICAL EXAM:  GENERAL: NAD, well-developed  HEAD:  Atraumatic, Normocephalic  EYES: EOMI, PERRLA, conjunctiva and sclera clear  NECK: Supple, No JVD  CHEST/LUNG: Clear to auscultation bilaterally; No wheeze  HEART: Regular rate and rhythm; No murmurs, rubs, or gallops  ABDOMEN: Soft, Nontender, Nondistended; Bowel sounds present  EXTREMITIES:  2+ Peripheral Pulses, right foot bandaged  PSYCH: AAOx3    NEUROLOGY:  vision grossly intact  hearing grossly intact   non-focal 5/5 upper and lower extremity strength  sensation grossly intact  EOMI PERRLA present  2+ bicep reflex bilaterally  SKIN: No rashes or lesions    .  LABS:                         13.2   6.17  )-----------( 379      ( 30 Aug 2019 06:52 )             41.9     08-30    142  |  104  |  13  ----------------------------<  96  4.3   |  28  |  0.88    Ca    9.2      30 Aug 2019 06:52  Mg     1.9     08-30        MEDICATIONS  (STANDING):  bictegravir 50 mG/emtricitabine 200 mG/tenofovir alafenamide 25 mG (BIKTARVY) 1 Tablet(s) Oral daily  chlorhexidine 2% Cloths 1 Application(s) Topical daily  QUEtiapine 25 milliGRAM(s) Oral at bedtime  risperiDONE   Tablet 1 milliGRAM(s) Oral two times a day  trimethoprim  160 mG/sulfamethoxazole 800 mG 1 Tablet(s) Oral two times a day    MEDICATIONS  (PRN):  haloperidol    Injectable 5 milliGRAM(s) IV Push every 8 hours PRN Acute agitation  LORazepam   Injectable 2 milliGRAM(s) IntraMuscular every 8 hours PRN Agitation  nicotine  Polacrilex Gum 2 milliGRAM(s) Oral every 3 hours PRN Nicotine craving HIV consult note  HPI:  34 y/o PMHx HIV (sexually contracted, dx 2007, last CD4 360s, VLUD as of May 2019 per patient, on Biktarvy), anxiety presenting to [facility] ED with complaints of R foot pain and swelling for the last 8 days. Patient's history dates back to earlier this month when he was at [facility] inpatient psych for a reported anxiety attack. Approximately 2 days ago, he was discharged from inpatient psych, after which he went back to a friend's house. Brought back to [facility] ED for further evaluation. Patient states there he had imaging done, was given IVF and abxs and had an I&D done on the dorsum of his foot (between the distal 4th and 5th metatarsal bones) and at around the insertion of the achilles tendon where he noted pus came out of the foot, being treated with bactrim at [facility]    States that he was dx with HIV due to high risk behavior, last iv meth use 2 weeks ago, last time tx for syphilis possibly 1 year ago but patient became agitated at questions saying "talk to my doctor." Was given biktarvy and bactrim by pcp and started this 6 months ago.       Vital Signs Last 24 Hrs  T(C): 36.8 (30 Aug 2019 16:42), Max: 36.8 (30 Aug 2019 16:42)  T(F): 98.3 (30 Aug 2019 16:42), Max: 98.3 (30 Aug 2019 16:42)  HR: 90 (30 Aug 2019 16:42) (84 - 103)  BP: 107/70 (30 Aug 2019 16:42) (107/70 - 120/78)  BP(mean): --  RR: 18 (30 Aug 2019 16:42) (17 - 18)  SpO2: 100% (30 Aug 2019 16:42) (98% - 100%)    PHYSICAL EXAM:  GENERAL: NAD, well-developed  HEAD:  Atraumatic, Normocephalic  EYES: EOMI, PERRLA, conjunctiva and sclera clear  NECK: Supple, No JVD  CHEST/LUNG: Clear to auscultation bilaterally; No wheeze  HEART: Regular rate and rhythm; No murmurs, rubs, or gallops  ABDOMEN: Soft, Nontender, Nondistended; Bowel sounds present  EXTREMITIES:  2+ Peripheral Pulses, right foot bandaged  PSYCH: AAOx3    NEUROLOGY:  vision grossly intact  hearing grossly intact   non-focal 5/5 upper and lower extremity strength  sensation grossly intact  EOMI PERRLA present  2+ bicep reflex bilaterally  SKIN: No rashes or lesions    .  LABS:                         13.2   6.17  )-----------( 379      ( 30 Aug 2019 06:52 )             41.9     08-30    142  |  104  |  13  ----------------------------<  96  4.3   |  28  |  0.88    Ca    9.2      30 Aug 2019 06:52  Mg     1.9     08-30        MEDICATIONS  (STANDING):  bictegravir 50 mG/emtricitabine 200 mG/tenofovir alafenamide 25 mG (BIKTARVY) 1 Tablet(s) Oral daily  chlorhexidine 2% Cloths 1 Application(s) Topical daily  QUEtiapine 25 milliGRAM(s) Oral at bedtime  risperiDONE   Tablet 1 milliGRAM(s) Oral two times a day  trimethoprim  160 mG/sulfamethoxazole 800 mG 1 Tablet(s) Oral two times a day    MEDICATIONS  (PRN):  haloperidol    Injectable 5 milliGRAM(s) IV Push every 8 hours PRN Acute agitation  LORazepam   Injectable 2 milliGRAM(s) IntraMuscular every 8 hours PRN Agitation  nicotine  Polacrilex Gum 2 milliGRAM(s) Oral every 3 hours PRN Nicotine craving

## 2019-08-30 NOTE — PROGRESS NOTE ADULT - SUBJECTIVE AND OBJECTIVE BOX
Patient is a 33y old  Male who presents with a chief complaint of R foot pain (30 Aug 2019 08:04)    INTERVAL HPI/ OVERNIGHT EVENTS: Agitated, aggressive, paranoid this AM. Calling residents inappropriate names, accusing us of being disrespectful, and telling us not to return for further dressing changes.     LABS                        13.2   6.17  )-----------( 379      ( 30 Aug 2019 06:52 )             41.9     08-30    142  |  104  |  13  ----------------------------<  96  4.3   |  28  |  0.88    Ca    9.2      30 Aug 2019 06:52  Mg     1.9     08-30    ICU Vital Signs Last 24 Hrs  T(C): 36.7 (30 Aug 2019 05:19), Max: 36.7 (30 Aug 2019 05:19)  T(F): 98 (30 Aug 2019 05:19), Max: 98 (30 Aug 2019 05:19)  HR: 84 (30 Aug 2019 05:19) (84 - 84)  BP: 119/75 (30 Aug 2019 05:19) (119/75 - 119/75)  BP(mean): --  ABP: --  ABP(mean): --  RR: 17 (30 Aug 2019 05:19) (17 - 17)  SpO2: 98% (30 Aug 2019 05:19) (98% - 98%)    RADIOLOGY    < from: CT Lower Extremity w/ IV Cont, Right (08.28.19 @ 05:58) >  IMPRESSION:  Subcutaneous soft tissue infiltration and vascular engorgement   surrounding the ankle and dorsal foot, likely representing cellulitis.   Suspicious for 0.9 cm superficial cutaneous abscess of the midline   posterior ankle. No osseous erosion or soft tissue gas. No fasciitis. No  osteomyelitis.    < end of copied text >    MICROBIOLOGY  Culture - Surgical Swab (08.28.19 @ 11:09)    Gram Stain:   Few Gram positive cocci in pairs  Moderate WBC's    Specimen Source: .Surgical Swab Right posterior abscess    Culture Results:   Moderate Staphylococcus aureus presumptive Methicillin resistant  Confirmation to follow within 24 hours  Susceptibility to follow.  Result called to and read back bySarah Griffiths RN  08/29/2019 12:42:09    Lower Extremity Focused:  No interval change in neurovascular status. Pedal and perimalleolar edema significantly improved compared to yesterday. Superficial wound 1cm by 2cm dorsum 4th webspace with granular tissue - no purulence, malodor, crepitus, -PTB. Superificial abscess lateral to achilles tendon ~ 3-4cm proximal to calcaneus insertion - No purulent drainage today, malodor, crepitus, -PTB. Protective sensation grossly intact bilaterally Patient is a 33y old  Male who presents with a chief complaint of R foot pain (30 Aug 2019 08:04)    INTERVAL HPI/ OVERNIGHT EVENTS: Agitated, aggressive, paranoid this AM. Told the team not to return for further dressing changes.     LABS                        13.2   6.17  )-----------( 379      ( 30 Aug 2019 06:52 )             41.9     08-30    142  |  104  |  13  ----------------------------<  96  4.3   |  28  |  0.88    Ca    9.2      30 Aug 2019 06:52  Mg     1.9     08-30    ICU Vital Signs Last 24 Hrs  T(C): 36.7 (30 Aug 2019 05:19), Max: 36.7 (30 Aug 2019 05:19)  T(F): 98 (30 Aug 2019 05:19), Max: 98 (30 Aug 2019 05:19)  HR: 84 (30 Aug 2019 05:19) (84 - 84)  BP: 119/75 (30 Aug 2019 05:19) (119/75 - 119/75)  BP(mean): --  ABP: --  ABP(mean): --  RR: 17 (30 Aug 2019 05:19) (17 - 17)  SpO2: 98% (30 Aug 2019 05:19) (98% - 98%)    RADIOLOGY    < from: CT Lower Extremity w/ IV Cont, Right (08.28.19 @ 05:58) >  IMPRESSION:  Subcutaneous soft tissue infiltration and vascular engorgement   surrounding the ankle and dorsal foot, likely representing cellulitis.   Suspicious for 0.9 cm superficial cutaneous abscess of the midline   posterior ankle. No osseous erosion or soft tissue gas. No fasciitis. No  osteomyelitis.    < end of copied text >    MICROBIOLOGY  Culture - Surgical Swab (08.28.19 @ 11:09)    Gram Stain:   Few Gram positive cocci in pairs  Moderate WBC's    Specimen Source: .Surgical Swab Right posterior abscess    Culture Results:   Moderate Staphylococcus aureus presumptive Methicillin resistant  Confirmation to follow within 24 hours  Susceptibility to follow.  Result called to and read back bySarah Griffiths RN  08/29/2019 12:42:09    Lower Extremity Focused:  No interval change in neurovascular status. Pedal and perimalleolar edema significantly improved compared to yesterday. Superficial wound 1cm by 2cm dorsum 4th webspace with granular tissue - no purulence, malodor, crepitus, -PTB. Superificial abscess lateral to achilles tendon ~ 3-4cm proximal to calcaneus insertion - No purulent drainage today, malodor, crepitus, -PTB. Protective sensation grossly intact bilaterally

## 2019-08-30 NOTE — PROGRESS NOTE ADULT - PROBLEM SELECTOR PLAN 2
H/o HIV since 2007. CD4 count 265 on admission. Patient has  at [facility]. Was previously on Biktarvy and Bactrim. However, patient has not been taking his ART for several weeks due to inability to fill prescription.   -Continue Biktarvy  -Can hold Bactrim as CD4 count >200  -f/u viral load

## 2019-08-30 NOTE — PROGRESS NOTE BEHAVIORAL HEALTH - NSBHADMITCOUNSEL_PSY_A_CORE
diagnostic results/impressions and/or recommended studies/importance of adherence to chosen treatment/client/family/caregiver education/risks and benefits of treatment options/instructions for management, treatment and follow up/risk factor reduction/prognosis
instructions for management, treatment and follow up/client/family/caregiver education/risk factor reduction/diagnostic results/impressions and/or recommended studies/risks and benefits of treatment options/importance of adherence to chosen treatment/prognosis

## 2019-08-30 NOTE — PROGRESS NOTE BEHAVIORAL HEALTH - NSBHCONSULTRECOMMENDOTHER_PSY_A_CORE FT
1)Start risperdal 1 mg q12h for psychosis.    2)CL will follow and discuss case with 8U nurse manager to see if pt needs admission.     3)Haldol 5 mg q6h prn agitation po/iv/im and ativan 2 mg q6h prn agitation po/im/iv. Recommend 1:1 observation for elopement.
1)Start risperdal 1 mg q12h for psychosis.    2)Discussed case with Dr. Gallagher, 8U unit chief. He meets criteria for involuntary admission on [facility] which I recommend so long as ID/epidemiology consulted to make recommnedations on whether pt needs contact precaution on [facility] and how to best enforce that. Without that, pt can't be admitted to [facility]. 2[facility] papers need to be completed.     3)Haldol 5 mg q6h prn agitation po/iv/im and ativan 2 mg q6h prn agitation po/im/iv. Recommend 1:1 observation for elopement.

## 2019-08-30 NOTE — PROGRESS NOTE ADULT - PROBLEM SELECTOR PLAN 3
Patient is a current cigarette smoker - also smokes marijuana and crystal meth. Last drug use was last week. Denies IVDU. Urine toxicology negative.   - SW consult  - continue nicotine gum 2mg q3h PRN for cravings
Current smoker. Current IVDU of methamphetamine likely contributing to acute psychosis. Last used 1-2 weeks PTA. Denies injecting into his legs. Also uses marijuana/synthetic marijuana.  -SW consult  -Continue nicotine gum 2mg q3h PRN for cravings  -Patient wants assistance to quit smoking but not interested in nicotine patch at present.
Current smoker. Current IVDU of methamphetamine. Last used 1-2 weeks PTA. Denies injecting into his legs. Also uses marijuana.  -SW consult  -Continue nicotine gum 2mg q3h PRN for cravings  -Patient wants assistance to quit smoking but not interested in nicotine patch at present.

## 2019-08-30 NOTE — PROGRESS NOTE ADULT - PROBLEM SELECTOR PLAN 4
Patient had recent admission to [facility] psych unit for 6 days and is reportedly on risperdal 2mg at home. Unclear of his diagnosis hypomania versus drug induced psychosis versus schizophrenia.  -SW following  -Psychiatry consulted, f/u recs  -Continue risperdal 1mg bid  -Continue seroquel 25mg qhs  -Ativan 2mg and Haldol 5mg iv/im PRN agitation

## 2019-08-30 NOTE — PROGRESS NOTE ADULT - PROBLEM SELECTOR PLAN 1
CT consistent with cellulitis and abscess of R foot with no evidence of fasciitis or osteomyelitis. s/p I&D and unclear abx at OSH ED. Patient remains afebrile w/ normal WBC count. Nontoxic appearing. Culture grew MRSA, sensitivities pending. Blood cx negative to date. Patient refused vanc trough yesterday.   -Podiatry consulted, appreciate recs  -Continue vancomycin 1250mg q12h  -Await sensitivities   -Discontinued unasyn 3g q6h  -Weight bearing as tolerated CT consistent with cellulitis and abscess of R foot with no evidence of fasciitis or osteomyelitis. s/p I&D and unclear abx at OSH ED. Patient remains afebrile w/ normal WBC count. Nontoxic appearing. Culture grew MRSA, sensitivities pending. Blood cx negative to date. Patient refused vanc trough yesterday.   -Podiatry consulted, appreciate recs  -Podiatry placed wound care orders and patient is medically cleared for psych transfer versus d/c  -Recommending bactrim for 14 days to treat cellulitis  -Continue vancomycin 1250mg q12h  -Await sensitivities - verbally received from micro and culture is sensitive to clindamycin, cipro, levaquin, and bactrim  -Discontinued unasyn 3g q6h  -Weight bearing as tolerated CT consistent with cellulitis and abscess of R foot with no evidence of fasciitis or osteomyelitis. s/p I&D and unclear abx at OSH ED. Patient remains afebrile w/ normal WBC count. Nontoxic appearing. Culture grew MRSA, sensitivities pending. Blood cx negative to date. Patient refused vanc trough yesterday.   -Podiatry consulted, appreciate recs  -Podiatry placed wound care orders and patient is medically cleared for psych transfer versus d/c  -Recommending bactrim for 14 days to treat cellulitis on discharge  -Continue vancomycin 1250mg q12h for now while inpt  -  verbally received from micro and culture is sensitive to clindamycin, cipro, levaquin, and bactrim  -Discontinued unasyn 3g q6h  -Weight bearing as tolerated

## 2019-08-30 NOTE — PROGRESS NOTE BEHAVIORAL HEALTH - NSBHCONSULTMEDAGITATION_PSY_A_CORE FT
Haldol 5 mg q6h prn agitation po/iv/im and ativan 2 mg q6h prn agitation po/im/iv.
Haldol 5 mg q6h prn agitation po/iv/im and ativan 2 mg q6h prn agitation po/im/iv.

## 2019-08-30 NOTE — PROGRESS NOTE ADULT - ATTENDING COMMENTS
Patient seen and examined at bedside. Agree with exam, a/p as above with the following addendum/edits:     R foot pain improved, denies further purulent drainage. Otherwise personality guarded and not providing further information. Wound culture showing MRSA, can d/c Unasyn. C/w Vanc, f/u sensitivities. F/u podiatry recs, f/u psych recs: regarding capacity and need for inpatient hospitalization. Likely will be medically stable for d/c tomorrow. HIV team consulted.
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

## 2019-08-30 NOTE — PROGRESS NOTE BEHAVIORAL HEALTH - NSBHFUPINTERVALHXFT_PSY_A_CORE
Tried to elope today and as per team was paranoid about what team wanted to do with him. He was given haldol and ativan which helped and he was sleeping when I went to visit. Apparently he especially liked the ativan. He is difficult for medical team to deal with due to and team asking for pt to be transferred to psychiatry. Has MRSA abscess of foot and would need contact precautions.
Tried to elope yesterday and as per team was paranoid about what team wanted to do with him. He does seem paranoid, labile, and easily irritated. I believe that in light of medical nonadherence, substance use, and the fact that psychosis likely impairing his judgment, he would benefit from 8U admission that would make it more likely for him to complete his antibiotic treatment. Also needs syphilis treatment.

## 2019-08-30 NOTE — CONSULT NOTE ADULT - ASSESSMENT
34 y/o PMHx HIV (sexually contracted, dx 2007, last CD4 360s, VLUD as of May 2019 here for foot abscess and agitation    #HIV  -f/u VL CD4 noted to be 265  -c/w biktarvy (pt states he was off of it for 2 weeks)  -no need for prophylaxis for OI    #Syphilis  -patient with RPR of 1:8, denies lesions and states he was treated roughly 1 year ago  -pt not cooperating with questions regarding prior treatments, please obtain collateral from PCP, will treat with penicillin benzathine im 2.4 million units once a week for total of 3 weeks duration (treat as late tertiary syphilis for now).
A/P 32 y/o PMHx HIV (sexually contracted, dx 2007, last CD4 360s, VLUD as of May 2019 per patient, on Biktarvy), anxiety presenting with right lower extremity abscess near achilles tendon and right dorsum wound at 4th webspace    - C/w IV Abx as per primary team  - Ct exam reviewed.   - Bedside I+D: Administered 10cc 2% Lidocaine plain around R dorsum wound. Prepped site with Chloroprep. Made 1cm incision over 4th webspace. No purulence expressed. Sanguinous drainage. Likely just cellulitis 2/2 superficial wound. Next, administed 10cc 2% Lidocaine plain around right achilles tendon abscess wound. Prepped site with chloroprep. Kierra 1cm incision over wound. Expressed ~ 4cc purulence with sanguinous drainage. Packed site with iodoform packing. Previously obtained a deep wound culture from this site. Flushed both areas, applied betadine and a dry sterile dressing to right lower extremity.  - Follow up wound culture from right abscess near achilles tendon   - Weight bearing as tolerated   Podiatry will follow

## 2019-08-30 NOTE — PROGRESS NOTE ADULT - ASSESSMENT
33M w/ pmh significant for HIV (diagnosed 2007, current CD4 count 265), current IVDU (last 3 weeks ago), and psychiatric history (on risperidone) who presented to the ED with R foot ulcer, swelling, and lesions and found to have MRSA cellulitis and abscess of R foot. Currently, on vancomycin.

## 2019-08-30 NOTE — PROGRESS NOTE BEHAVIORAL HEALTH - SUMMARY
Pt is a 32 y/o AA single MSM, domiciled alone, with PMHx HIV (sexually contracted, dx 2007, last CD4 360s, VLUD as of May 2019 per patient, on Biktarvy), unclear prior psychiatric diagnosis (possible anxiety, possible Bipolar disorder), ongoing IV crystal meth use (last use two weeks ago),  presenting to [facility] ED with complaints of R foot pain and swelling for the last 8 days.  As per team pt paranoid and trying to elope. Snowed on haldol 5 and ativan 2.     1)Start risperdal 1 mg q12h for psychosis.    2)CL will follow and discuss case with 8U nurse manager to see if pt needs admission.     3)Haldol 5 mg q6h prn agitation po/iv/im and ativan 2 mg q6h prn agitation po/im/iv. Recommend 1:1 observation for elopement.
Pt is a 34 y/o AA single MSM, domiciled alone, with PMHx HIV (sexually contracted, dx 2007, last CD4 360s, VLUD as of May 2019 per patient, on Biktarvy), unclear prior psychiatric diagnosis (possible anxiety, possible Bipolar disorder), ongoing IV crystal meth use (last use two weeks ago),  presenting to [facility] ED with complaints of R foot pain and swelling for the last 8 days.  As per team pt paranoid and trying to elope. Snowed on haldol 5 and ativan 2.     1)Start risperdal 1 mg q12h for psychosis.    2)Discussed case with Dr. Gallagher, 8U unit chief. He meets criteria for involuntary admission on [facility] which I recommend so long as ID/epidemiology consulted to make recommnedations on whether pt needs contact precaution on [facility] and how to best enforce that. Without that, pt can't be admitted to [facility]. 2[facility] papers need to be completed.     3)Haldol 5 mg q6h prn agitation po/iv/im and ativan 2 mg q6h prn agitation po/im/iv. Recommend 1:1 observation for elopement.

## 2019-08-30 NOTE — CHART NOTE - NSCHARTNOTEFT_GEN_A_CORE
Psychiatry Brief Note:    32 y/o AA single MSM, domiciled alone, with PMHx HIV (sexually contracted, dx 2007, last CD4 360s, VLUD as of May 2019 per patient, on Biktarvy), unclear prior psychiatric diagnosis (possible anxiety, possible Bipolar disorder), ongoing IV crystal meth use (last use two weeks ago),  presenting to [facility] ED with complaints of R foot pain and swelling for the last 8 days.    Patient transferred from [address] [place] [address] where he was initially admitted for management of cellulitis of R foot. Will continue plan as per Psychiatry CL team. Please see CL notes for details. Will also continue plan as per Medicine, Infectious Disease, and Podiatry recs.      Plan:  -- Bactrim DS 800mg PO BID x 14 days  -- Biktarvy PO Daily  -- Chlorhexadine 2% soaked cloth, Topical on Right Foot cellulitis Daily  -- Penicillin G benzathine [id number] unit(s) IM QWeekly x 3 weeks, next dose due 9/6/19  -- Risperdal 1mg PO BID for psychosis

## 2019-08-30 NOTE — PROGRESS NOTE ADULT - PROBLEM SELECTOR PROBLEM 6
Prophylactic measure
Nutrition, metabolism, and development symptoms
Nutrition, metabolism, and development symptoms

## 2019-08-30 NOTE — PROGRESS NOTE ADULT - PROBLEM SELECTOR PLAN 7
1) PCP Contacted on Admission: (Y/N) --> Name & Phone #: Flo Denning (Primary Care)  2) Date of Contact with PCP: Can contact in AM to obtain collateral on history  3) PCP Contacted at Discharge: (Y/N)  4) Summary of Handoff Given to PCP:   5) Post-Discharge Appointment Date and Location:
1) PCP Contacted on Admission: (Y/N) --> Name & Phone #: Flo [place] (Primary Care)  2) Date of Contact with PCP: Can contact in AM to obtain collateral on history  3) PCP Contacted at Discharge: (Y/N)  4) Summary of Handoff Given to PCP:   5) Post-Discharge Appointment Date and Location:
1) PCP Contacted on Admission: (Y/N) --> Name & Phone #: Flo [place] (Primary Care)  2) Date of Contact with PCP: Can contact in AM to obtain collateral on history  3) PCP Contacted at Discharge: (Y/N)  4) Summary of Handoff Given to PCP:   5) Post-Discharge Appointment Date and Location:

## 2019-08-30 NOTE — PROGRESS NOTE ADULT - SUBJECTIVE AND OBJECTIVE BOX
OVERNIGHT EVENTS:  House staff notified overnight that patient was agitated. To bedside to assess, however patient was then sleeping comfortably in NAD. No PRN medications given overnight.    SUBJECTIVE:  Patient seen and examined at the bedside this am. Difficult to arouse from sleep. Not interested in talking, responding to question with 'good.' Denying foot pain, fever or chills, chest pain. No other complaints except that he does not want others to hear what is being said about him. Queried patient with regard to his history of syphilis and the current positive test results. He responded with "that's what I was worried about." When asked when he contracted syphilis in the past and whether or not he has been treated, the patient provided no clear response stating "if I had it before you would know if I've been treated." Will attempt to obtain collateral with regard to STI hx.    Vital Signs Last 12 Hrs  T(F): 98 (08-30-19 @ 05:19), Max: 98 (08-30-19 @ 05:19)  HR: 84 (08-30-19 @ 05:19) (84 - 84)  BP: 119/75 (08-30-19 @ 05:19) (119/75 - 119/75)  BP(mean): --  RR: 17 (08-30-19 @ 05:19) (17 - 17)  SpO2: 98% (08-30-19 @ 05:19) (98% - 98%)  I&O's Summary    29 Aug 2019 07:01  -  30 Aug 2019 07:00  --------------------------------------------------------  IN: 900 mL / OUT: 700 mL / NET: 200 mL    30 Aug 2019 07:01  -  30 Aug 2019 08:05  --------------------------------------------------------  IN: 0 mL / OUT: 500 mL / NET: -500 mL        PHYSICAL EXAM:    Constitutional: NAD, AAOx3, somnolent and difficult to arouse from sleep  HEENT: MMM, PERRLA, EOMI, anicteric, tongue protrudes midline with fasciculations  Neck: Supple, no JVD, no LAD   Respiratory: Normal rate, rhythm, depth, effort. CTAB. No w/r/r.   Cardiovascular: RRR, normal S1 and S2, no m/r/g.   Gastrointestinal: +BS, soft NTND, no palpable masses  Extremities: R foot wrapped with gauze, no drainage observed, minimal foul odor, LEs are nonedematous and equal in size and warmth, palpable pulses b/l  Neurological: Cranial nerves grossly intact, strength and sensation intact throughout   Psychiatric: Paranoid during exam, psychomotor slowing, somnolent      LABS:                        13.2   6.17  )-----------( 379      ( 30 Aug 2019 06:52 )             41.9     08-30    142  |  104  |  13  ----------------------------<  96  4.3   |  28  |  0.88    Ca    9.2      30 Aug 2019 06:52  Mg     1.9     08-30            RADIOLOGY & ADDITIONAL TESTS:    MEDICATIONS  (STANDING):  bictegravir 50 mG/emtricitabine 200 mG/tenofovir alafenamide 25 mG (BIKTARVY) 1 Tablet(s) Oral daily  QUEtiapine 25 milliGRAM(s) Oral at bedtime  risperiDONE   Tablet 1 milliGRAM(s) Oral two times a day  vancomycin  IVPB 1250 milliGRAM(s) IV Intermittent every 12 hours    MEDICATIONS  (PRN):  haloperidol    Injectable 5 milliGRAM(s) IV Push every 8 hours PRN Acute agitation  LORazepam   Injectable 2 milliGRAM(s) IntraMuscular every 8 hours PRN Agitation  nicotine  Polacrilex Gum 2 milliGRAM(s) Oral every 3 hours PRN Nicotine craving OVERNIGHT EVENTS:  House staff notified overnight that patient was agitated. To bedside to assess, however patient was then sleeping comfortably in NAD. No PRN medications given overnight.    SUBJECTIVE:  Patient seen and examined at the bedside this am. Difficult to arouse from sleep. Not interested in talking, responding to question with 'good.' Denying foot pain, fever or chills, chest pain. No other complaints except that he does not want others to hear what is being said about him. Queried patient with regard to his history of syphilis and the current positive test results. He responded with "that's what I was worried about." When asked when he contracted syphilis in the past and whether or not he has been treated, the patient provided no clear response stating "if I had it before you would know if I've been treated." Will attempt to obtain collateral with regard to STI hx.    Vital Signs Last 12 Hrs  T(F): 98 (08-30-19 @ 05:19), Max: 98 (08-30-19 @ 05:19)  HR: 84 (08-30-19 @ 05:19) (84 - 84)  BP: 119/75 (08-30-19 @ 05:19) (119/75 - 119/75)  BP(mean): --  RR: 17 (08-30-19 @ 05:19) (17 - 17)  SpO2: 98% (08-30-19 @ 05:19) (98% - 98%)  I&O's Summary    29 Aug 2019 07:01  -  30 Aug 2019 07:00  --------------------------------------------------------  IN: 900 mL / OUT: 700 mL / NET: 200 mL    30 Aug 2019 07:01  -  30 Aug 2019 08:05  --------------------------------------------------------  IN: 0 mL / OUT: 500 mL / NET: -500 mL        PHYSICAL EXAM:    Constitutional: NAD, AAOx3, somnolent and difficult to arouse from sleep  HEENT: MMM, PERRLA, EOMI, anicteric, tongue protrudes midline with fasciculations  Neck: Supple, no JVD, no LAD   Respiratory: Normal rate, rhythm, depth, effort. CTAB. No w/r/r.   Cardiovascular: RRR, normal S1 and S2, no m/r/g.   Gastrointestinal: +BS, soft NTND, no palpable masses  Extremities: R foot wrapped with gauze, no drainage observed, minimal foul odor, LEs are nonedematous and equal in size and warmth, palpable pulses b/l  Neurological: aao x 3, Cranial nerves ii-xii grossly intact, 5/5 strength all 4 ext, sensation intact throughout   Psychiatric: Paranoid during exam, psychomotor slowing     LABS:                        13.2   6.17  )-----------( 379      ( 30 Aug 2019 06:52 )             41.9     08-30    142  |  104  |  13  ----------------------------<  96  4.3   |  28  |  0.88    Ca    9.2      30 Aug 2019 06:52  Mg     1.9     08-30            RADIOLOGY & ADDITIONAL TESTS:    MEDICATIONS  (STANDING):  bictegravir 50 mG/emtricitabine 200 mG/tenofovir alafenamide 25 mG (BIKTARVY) 1 Tablet(s) Oral daily  QUEtiapine 25 milliGRAM(s) Oral at bedtime  risperiDONE   Tablet 1 milliGRAM(s) Oral two times a day  vancomycin  IVPB 1250 milliGRAM(s) IV Intermittent every 12 hours    MEDICATIONS  (PRN):  haloperidol    Injectable 5 milliGRAM(s) IV Push every 8 hours PRN Acute agitation  LORazepam   Injectable 2 milliGRAM(s) IntraMuscular every 8 hours PRN Agitation  nicotine  Polacrilex Gum 2 milliGRAM(s) Oral every 3 hours PRN Nicotine craving

## 2019-08-30 NOTE — PROGRESS NOTE BEHAVIORAL HEALTH - NSBHFUPINTERVALCCFT_PSY_A_CORE
Pt is a 34 y/o AA single MSM, domiciled alone, with PMHx HIV (sexually contracted, dx 2007, last CD4 360s, VLUD as of May 2019 per patient, on Biktarvy), unclear prior psychiatric diagnosis (possible anxiety, possible Bipolar disorder), ongoing IV crystal meth use (last use two weeks ago),  presenting to [facility] ED with complaints of R foot pain and swelling for the last 8 days.  Pt presents with fast, difficult to interrupt speech and anxious mood. Differential includes hypomanic episode vs anxiety. Pt is endorsing questionable PI ideation about his housing. However we will need to corroborate this with outpatient SW. There is no evidence of agitation or AVH/HI/SI. Pt demonstrates adequate understanding of his current medical issues, cooperative with care.
Pt is a 34 y/o AA single MSM, domiciled alone, with PMHx HIV (sexually contracted, dx 2007, last CD4 360s, VLUD as of May 2019 per patient, on Biktarvy), unclear prior psychiatric diagnosis (possible anxiety, possible Bipolar disorder), ongoing IV crystal meth use (last use two weeks ago),  presenting to [facility] ED with complaints of R foot pain and swelling for the last 8 days.  Pt presents with fast, difficult to interrupt speech and anxious mood. Differential includes hypomanic episode vs anxiety. Pt is endorsing questionable PI ideation about his housing. However we will need to corroborate this with outpatient SW. There is no evidence of agitation or AVH/HI/SI. Pt demonstrates adequate understanding of his current medical issues, cooperative with care.
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
## 2019-08-30 NOTE — PROGRESS NOTE ADULT - PROBLEM SELECTOR PLAN 6
F: no IVF indicated - s/p 1L NS in ED  E: Replete electrolytes PRN, Goal: K>4, Mg>2  N: Regular diet    DVT ppx: Lovenox 40 mg daily - discontinued this AM  CODE: FULL  Dispo: Admit to F.
F: no IVF indicated   E: Replete electrolytes PRN, Goal: K>4, Mg>2  N: Regular diet    DVT ppx: Lovenox 40 mg daily  CODE: FULL  Dispo: Admit to F.
F: no IVF indicated   E: Replete electrolytes PRN, Goal: K>4, Mg>2  N: Regular diet    DVT ppx: Lovenox 40 mg daily  CODE: FULL  Dispo: Admit to F.

## 2019-08-31 PROBLEM — B20 HUMAN IMMUNODEFICIENCY VIRUS [HIV] DISEASE: Chronic | Status: ACTIVE | Noted: 2019-08-28

## 2019-08-31 PROBLEM — F41.9 ANXIETY DISORDER, UNSPECIFIED: Chronic | Status: ACTIVE | Noted: 2019-08-28

## 2019-08-31 PROBLEM — F19.20 OTHER PSYCHOACTIVE SUBSTANCE DEPENDENCE, UNCOMPLICATED: Chronic | Status: ACTIVE | Noted: 2019-08-28

## 2019-08-31 LAB
CHOLEST SERPL-MCNC: 174 MG/DL — SIGNIFICANT CHANGE UP (ref 10–199)
HDLC SERPL-MCNC: 35 MG/DL — LOW
LIPID PNL WITH DIRECT LDL SERPL: 127 MG/DL — HIGH
TOTAL CHOLESTEROL/HDL RATIO MEASUREMENT: 5 RATIO — SIGNIFICANT CHANGE UP (ref 3.4–9.6)
TRIGL SERPL-MCNC: 59 MG/DL — SIGNIFICANT CHANGE UP (ref 10–149)

## 2019-08-31 RX ADMIN — Medication 2 MILLIGRAM(S): at 21:43

## 2019-08-31 RX ADMIN — Medication 2 MILLIGRAM(S): at 21:41

## 2019-08-31 RX ADMIN — RISPERIDONE 1 MILLIGRAM(S): 4 TABLET ORAL at 10:40

## 2019-08-31 RX ADMIN — Medication 50 MILLIGRAM(S): at 21:41

## 2019-08-31 RX ADMIN — Medication 1 TABLET(S): at 10:40

## 2019-08-31 RX ADMIN — BICTEGRAVIR SODIUM, EMTRICITABINE, AND TENOFOVIR ALAFENAMIDE FUMARATE 1 TABLET(S): 30; 120; 15 TABLET ORAL at 10:40

## 2019-08-31 RX ADMIN — RISPERIDONE 1 MILLIGRAM(S): 4 TABLET ORAL at 21:41

## 2019-08-31 RX ADMIN — Medication 650 MILLIGRAM(S): at 14:50

## 2019-08-31 RX ADMIN — Medication 650 MILLIGRAM(S): at 21:42

## 2019-08-31 RX ADMIN — Medication 1 TABLET(S): at 21:42

## 2019-08-31 RX ADMIN — Medication 650 MILLIGRAM(S): at 13:55

## 2019-08-31 RX ADMIN — CHLORHEXIDINE GLUCONATE 1 APPLICATION(S): 213 SOLUTION TOPICAL at 10:41

## 2019-08-31 NOTE — BEHAVIORAL HEALTH ASSESSMENT NOTE - NSBHCHARTREVIEWIMAGING_PSY_A_CORE FT
< from: CT Lower Extremity w/ IV Cont, Right (08.28.19 @ 05:58) >      IMPRESSION:  Subcutaneous soft tissue infiltration and vascular engorgement   surrounding the ankle and dorsal foot, likely representing cellulitis.   Suspicious for 0.9 cm superficial cutaneous abscess of the midline   posterior ankle. No osseous erosion or soft tissue gas. No fasciitis. No  osteomyelitis.    < end of copied text >
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
## 2019-08-31 NOTE — BEHAVIORAL HEALTH ASSESSMENT NOTE - NSBHCHARTREVIEWVS_PSY_A_CORE FT
Vital Signs Last 24 Hrs  T(C): 36.6 (31 Aug 2019 06:08), Max: 36.8 (30 Aug 2019 16:42)  T(F): 97.8 (31 Aug 2019 06:08), Max: 98.3 (30 Aug 2019 16:42)  HR: 81 (31 Aug 2019 06:08) (81 - 106)  BP: 103/64 (31 Aug 2019 06:08) (103/64 - 135/80)  BP(mean): --  RR: 18 (31 Aug 2019 06:08) (18 - 18)  SpO2: 98% (31 Aug 2019 06:08) (98% - 100%) Vital Signs Last 24 Hrs  T(C): 36.9 (03 Sep 2019 20:32), Max: 36.9 (03 Sep 2019 06:25)  T(F): 98.5 (03 Sep 2019 20:32), Max: 98.5 (03 Sep 2019 20:32)  HR: 101 (03 Sep 2019 20:32) (92 - 101)  BP: 128/66 (03 Sep 2019 20:32) (107/69 - 128/66)  BP(mean): --  RR: 18 (03 Sep 2019 20:32) (18 - 20)  SpO2: 98% (03 Sep 2019 20:32) (98% - 100%)

## 2019-08-31 NOTE — BEHAVIORAL HEALTH ASSESSMENT NOTE - SUMMARY
Pt is a 34 y/o AA single MSM, domiciled alone, with PMHx HIV (sexually contracted, dx 2007, last CD4 360s, VLUD as of May 2019 per patient, on Biktarvy), unclear prior psychiatric diagnosis (possible anxiety, possible Bipolar disorder), ongoing IV crystal meth use (last use two weeks ago), admitted to medicine for cellulitis and abscess, found to have syphilis, transferred to psychiatry from medicine for manic symptoms. Presently, patient is followed by ID for the syphilis. He continues to be pressured, exhibits mood lability, flight of ideas, disorganized thought process, paranoia, pressured speech.    plan:  1. continue risperidone 1mg BID  2. continue bactrim and penicillin G as per medicine and ID  3. continue prn ativan, haldol, trazodone  4. follow up with IM and ID

## 2019-08-31 NOTE — BEHAVIORAL HEALTH ASSESSMENT NOTE - NSBHCHARTREVIEWLAB_PSY_A_CORE FT
13.2   6.17  )-----------( 379      ( 30 Aug 2019 06:52 )             41.9     08-30    142  |  104  |  13  ----------------------------<  96  4.3   |  28  |  0.88    Ca    9.2      30 Aug 2019 06:52  Mg     1.9     08-30    Treponema Pallidum Antibody Interpretation: Positive:   RPR: positive  RPR titer: 1:8

## 2019-08-31 NOTE — BEHAVIORAL HEALTH ASSESSMENT NOTE - DETAILS
boogie for unknown reason foot pain pain mother with likely psychiatric diagnosis mother with crack cocaine use

## 2019-09-01 RX ADMIN — Medication 2 MILLIGRAM(S): at 10:20

## 2019-09-01 RX ADMIN — BICTEGRAVIR SODIUM, EMTRICITABINE, AND TENOFOVIR ALAFENAMIDE FUMARATE 1 TABLET(S): 30; 120; 15 TABLET ORAL at 10:16

## 2019-09-01 RX ADMIN — HALOPERIDOL DECANOATE 5 MILLIGRAM(S): 100 INJECTION INTRAMUSCULAR at 18:25

## 2019-09-01 RX ADMIN — Medication 1 TABLET(S): at 10:16

## 2019-09-01 RX ADMIN — Medication 2 MILLIGRAM(S): at 19:11

## 2019-09-01 RX ADMIN — CHLORHEXIDINE GLUCONATE 1 APPLICATION(S): 213 SOLUTION TOPICAL at 09:03

## 2019-09-01 RX ADMIN — Medication 2 MILLIGRAM(S): at 18:25

## 2019-09-01 RX ADMIN — RISPERIDONE 1 MILLIGRAM(S): 4 TABLET ORAL at 10:16

## 2019-09-01 RX ADMIN — RISPERIDONE 1 MILLIGRAM(S): 4 TABLET ORAL at 22:39

## 2019-09-01 RX ADMIN — Medication 1 TABLET(S): at 22:39

## 2019-09-01 NOTE — PROGRESS NOTE BEHAVIORAL HEALTH - NSBHFUPINTERVALHXFT_PSY_A_CORE
Patient stable overnight, no acute events. Attempted to interview patient today in his room, found to be sleeping. Opened eyes but shook his head when asked questions and went back to sleep. Nursing reports patient continues to express paranoid ideation however is more organized then he was on admission.

## 2019-09-01 NOTE — PROGRESS NOTE BEHAVIORAL HEALTH - SUMMARY
Pt is a 32 y/o AA single MSM, domiciled alone, with PMHx HIV (sexually contracted, dx 2007, last CD4 360s, VLUD as of May 2019 per patient, on Biktarvy), unclear prior psychiatric diagnosis (possible anxiety, possible Bipolar disorder), ongoing IV crystal meth use (last use two weeks ago), admitted to medicine for cellulitis and abscess, found to have syphilis, transferred to psychiatry from medicine for manic symptoms. Presently, patient is followed by ID for the syphilis. He continues to be pressured, exhibits mood lability, flight of ideas, disorganized thought process, paranoia, pressured speech.    plan:  1. continue risperidone 1mg BID  2. continue bactrim and penicillin G as per medicine and ID  3. continue prn ativan, haldol, trazodone  4. follow up with IM and ID

## 2019-09-02 LAB
CULTURE RESULTS: SIGNIFICANT CHANGE UP
CULTURE RESULTS: SIGNIFICANT CHANGE UP
SPECIMEN SOURCE: SIGNIFICANT CHANGE UP
SPECIMEN SOURCE: SIGNIFICANT CHANGE UP

## 2019-09-02 RX ORDER — RISPERIDONE 4 MG/1
1 TABLET ORAL DAILY
Refills: 0 | Status: DISCONTINUED | OUTPATIENT
Start: 2019-09-02 | End: 2019-09-04

## 2019-09-02 RX ORDER — RISPERIDONE 4 MG/1
2 TABLET ORAL AT BEDTIME
Refills: 0 | Status: DISCONTINUED | OUTPATIENT
Start: 2019-09-02 | End: 2019-09-04

## 2019-09-02 RX ADMIN — Medication 25 MILLIGRAM(S): at 03:02

## 2019-09-02 RX ADMIN — Medication 650 MILLIGRAM(S): at 18:26

## 2019-09-02 RX ADMIN — RISPERIDONE 1 MILLIGRAM(S): 4 TABLET ORAL at 10:26

## 2019-09-02 RX ADMIN — BICTEGRAVIR SODIUM, EMTRICITABINE, AND TENOFOVIR ALAFENAMIDE FUMARATE 1 TABLET(S): 30; 120; 15 TABLET ORAL at 10:26

## 2019-09-02 RX ADMIN — Medication 1 TABLET(S): at 21:37

## 2019-09-02 RX ADMIN — Medication 2 MILLIGRAM(S): at 21:37

## 2019-09-02 RX ADMIN — Medication 50 MILLIGRAM(S): at 21:37

## 2019-09-02 RX ADMIN — CHLORHEXIDINE GLUCONATE 1 APPLICATION(S): 213 SOLUTION TOPICAL at 10:28

## 2019-09-02 RX ADMIN — RISPERIDONE 2 MILLIGRAM(S): 4 TABLET ORAL at 21:37

## 2019-09-02 RX ADMIN — Medication 1 TABLET(S): at 10:26

## 2019-09-02 NOTE — PROGRESS NOTE BEHAVIORAL HEALTH - NSBHFUPINTERVALHXFT_PSY_A_CORE
patient denies depression, SI, AH, VH. states he feels great and "I better be discharged by friday". He remains pressured with flight of ideas and agitation

## 2019-09-02 NOTE — PROGRESS NOTE BEHAVIORAL HEALTH - SUMMARY
Pt is a 34 y/o AA single MSM, domiciled alone, with PMHx HIV (sexually contracted, dx 2007, last CD4 360s, VLUD as of May 2019 per patient, on Biktarvy), unclear prior psychiatric diagnosis (possible anxiety, possible Bipolar disorder), ongoing IV crystal meth use (last use two weeks ago), admitted to medicine for cellulitis and abscess, found to have syphilis, transferred to psychiatry from medicine for manic symptoms. Presently, patient is followed by ID for the syphilis. He continues to be pressured, exhibits mood lability, flight of ideas, disorganized thought process, paranoia, pressured speech.    plan:  1. increase risperidone from 1mg BID to 1mgqAM and 2mg qPM  2. continue bactrim and penicillin G as per medicine and ID  3. continue prn ativan, haldol, trazodone  4. follow up with IM and ID about syphilis/abscess rec treatment

## 2019-09-02 NOTE — PROGRESS NOTE BEHAVIORAL HEALTH - NSBHCHARTREVIEWVS_PSY_A_CORE FT
Vital Signs Last 24 Hrs  T(C): 36.8 (02 Sep 2019 06:07), Max: 36.8 (01 Sep 2019 16:22)  T(F): 98.3 (02 Sep 2019 06:07), Max: 98.3 (01 Sep 2019 16:22)  HR: 111 (02 Sep 2019 06:07) (106 - 111)  BP: 124/67 (02 Sep 2019 06:07) (124/67 - 143/78)  BP(mean): --  RR: 18 (02 Sep 2019 06:07) (18 - 18)  SpO2: 98% (02 Sep 2019 06:07) (98% - 99%)

## 2019-09-03 DIAGNOSIS — Z21 ASYMPTOMATIC HUMAN IMMUNODEFICIENCY VIRUS [HIV] INFECTION STATUS: ICD-10-CM

## 2019-09-03 DIAGNOSIS — F15.10 OTHER STIMULANT ABUSE, UNCOMPLICATED: ICD-10-CM

## 2019-09-03 DIAGNOSIS — F19.19 OTHER PSYCHOACTIVE SUBSTANCE ABUSE WITH UNSPECIFIED PSYCHOACTIVE SUBSTANCE-INDUCED DISORDER: ICD-10-CM

## 2019-09-03 DIAGNOSIS — F12.10 CANNABIS ABUSE, UNCOMPLICATED: ICD-10-CM

## 2019-09-03 DIAGNOSIS — L03.115 CELLULITIS OF RIGHT LOWER LIMB: ICD-10-CM

## 2019-09-03 DIAGNOSIS — F31.13 BIPOLAR DISORDER, CURRENT EPISODE MANIC WITHOUT PSYCHOTIC FEATURES, SEVERE: ICD-10-CM

## 2019-09-03 DIAGNOSIS — Z72.51 HIGH RISK HETEROSEXUAL BEHAVIOR: ICD-10-CM

## 2019-09-03 DIAGNOSIS — F41.9 ANXIETY DISORDER, UNSPECIFIED: ICD-10-CM

## 2019-09-03 DIAGNOSIS — M79.671 PAIN IN RIGHT FOOT: ICD-10-CM

## 2019-09-03 DIAGNOSIS — F19.20 OTHER PSYCHOACTIVE SUBSTANCE DEPENDENCE, UNCOMPLICATED: ICD-10-CM

## 2019-09-03 DIAGNOSIS — F31.2 BIPOLAR DISORDER, CURRENT EPISODE MANIC SEVERE WITH PSYCHOTIC FEATURES: ICD-10-CM

## 2019-09-03 DIAGNOSIS — A53.0 LATENT SYPHILIS, UNSPECIFIED AS EARLY OR LATE: ICD-10-CM

## 2019-09-03 DIAGNOSIS — F17.200 NICOTINE DEPENDENCE, UNSPECIFIED, UNCOMPLICATED: ICD-10-CM

## 2019-09-03 PROCEDURE — 99233 SBSQ HOSP IP/OBS HIGH 50: CPT

## 2019-09-03 RX ADMIN — RISPERIDONE 2 MILLIGRAM(S): 4 TABLET ORAL at 21:44

## 2019-09-03 RX ADMIN — Medication 2 MILLIGRAM(S): at 10:06

## 2019-09-03 RX ADMIN — BICTEGRAVIR SODIUM, EMTRICITABINE, AND TENOFOVIR ALAFENAMIDE FUMARATE 1 TABLET(S): 30; 120; 15 TABLET ORAL at 09:52

## 2019-09-03 RX ADMIN — Medication 1 TABLET(S): at 21:44

## 2019-09-03 RX ADMIN — Medication 2 MILLIGRAM(S): at 21:44

## 2019-09-03 RX ADMIN — RISPERIDONE 1 MILLIGRAM(S): 4 TABLET ORAL at 09:52

## 2019-09-03 RX ADMIN — Medication 1 TABLET(S): at 09:52

## 2019-09-03 NOTE — PROGRESS NOTE BEHAVIORAL HEALTH - NSBHCHARTREVIEWVS_PSY_A_CORE FT
Vital Signs Last 24 Hrs  T(C): 36.9 (03 Sep 2019 20:32), Max: 37 (02 Sep 2019 21:00)  T(F): 98.5 (03 Sep 2019 20:32), Max: 98.6 (02 Sep 2019 21:00)  HR: 101 (03 Sep 2019 20:32) (92 - 105)  BP: 128/66 (03 Sep 2019 20:32) (107/69 - 128/66)  BP(mean): --  RR: 18 (03 Sep 2019 20:32) (18 - 20)  SpO2: 98% (03 Sep 2019 20:32) (98% - 100%)

## 2019-09-03 NOTE — PROGRESS NOTE BEHAVIORAL HEALTH - NSBHFUPINTERVALHXFT_PSY_A_CORE
labile; irritable; denies s/h i/i/p and dc focused; not blocked; no tremor; anxious ; speech wnl;  ;oriented;
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
## 2019-09-03 NOTE — PROGRESS NOTE BEHAVIORAL HEALTH - RISK ASSESSMENT
Risk elements: history of schizoaffective illness; possible manic history; history of bipolar disorder; not working or having regular daily routine; appears able to comprehend situation; recent inpatient admission(s); -mother;   At time of admission suicide risk was HIGH.    Static: chronic psychiatric disorder; mood issues; mood episodes; may be under financial stress; chronic psychiatric issues;     Modifiable: treatment of psychotic sxs; treat underlying mood issues; help  focus on personal goals and structurd daily activities; assess/address treatment compliance and efficacy issues; assist engaging possible support network;     Protective: cognitively able to engage in treatment; may have involved family;   Modifiable factors addressed in treatment plan; see summary and interval data for updates    Estimated length of stay based on admission data is 19 days. Estimated discharge date is/was: 09/18/19.

## 2019-09-04 PROCEDURE — 99232 SBSQ HOSP IP/OBS MODERATE 35: CPT

## 2019-09-04 PROCEDURE — 99231 SBSQ HOSP IP/OBS SF/LOW 25: CPT | Mod: GC

## 2019-09-04 RX ORDER — RISPERIDONE 4 MG/1
2 TABLET ORAL
Refills: 0 | Status: DISCONTINUED | OUTPATIENT
Start: 2019-09-04 | End: 2019-09-11

## 2019-09-04 RX ADMIN — CHLORHEXIDINE GLUCONATE 1 APPLICATION(S): 213 SOLUTION TOPICAL at 12:38

## 2019-09-04 RX ADMIN — BICTEGRAVIR SODIUM, EMTRICITABINE, AND TENOFOVIR ALAFENAMIDE FUMARATE 1 TABLET(S): 30; 120; 15 TABLET ORAL at 10:57

## 2019-09-04 RX ADMIN — Medication 650 MILLIGRAM(S): at 09:27

## 2019-09-04 RX ADMIN — Medication 1 TABLET(S): at 22:11

## 2019-09-04 RX ADMIN — Medication 2 MILLIGRAM(S): at 10:59

## 2019-09-04 RX ADMIN — Medication 650 MILLIGRAM(S): at 18:50

## 2019-09-04 RX ADMIN — RISPERIDONE 2 MILLIGRAM(S): 4 TABLET ORAL at 22:16

## 2019-09-04 RX ADMIN — Medication 2 MILLIGRAM(S): at 22:12

## 2019-09-04 RX ADMIN — Medication 2 MILLIGRAM(S): at 18:50

## 2019-09-04 RX ADMIN — Medication 650 MILLIGRAM(S): at 21:16

## 2019-09-04 RX ADMIN — Medication 50 MILLIGRAM(S): at 22:11

## 2019-09-04 RX ADMIN — RISPERIDONE 1 MILLIGRAM(S): 4 TABLET ORAL at 10:57

## 2019-09-04 RX ADMIN — Medication 1 TABLET(S): at 10:57

## 2019-09-04 NOTE — PROGRESS NOTE BEHAVIORAL HEALTH - SUMMARY
Pt is a 32 y/o AA single MSM, domiciled alone, with PMHx HIV (sexually contracted, dx 2007, last CD4 360s, VLUD as of May 2019 per patient, on Biktarvy), unclear prior psychiatric diagnosis (possible anxiety, possible Bipolar disorder), ongoing IV crystal meth use (last use two weeks ago),  presenting to [facility] ED with complaints of R foot pain and swelling for the last 8 days.    Pt presents with fast, softer, difficult to interrupt speech but calmer mood than before and requested for increase in his risperidone. Differential includes hypomanic episode vs anxiety. Pt is endorsing questionable PI ideation about his housing. However we will need to corroborate this with outpatient SW. There is no evidence of agitation or AVH/HI/SI. Pt demonstrates adequate understanding of his current medical issues, cooperative with care.    Plan:  -Increasing daily dose of Risperidone to 2mg  -Continue nightly dose of Risperidone to 2mg  -Continue Acetaminophen 650mg Q6hrs PRN for pain  -Continue Hydroxyzine 25mg BID PRN for anxiety   -Continue Lorazepam 2mg QID PRN for agitation   -Continue Nicotine gum 2mg Q2hrs PRN for smoking urge  -Continue Trazodone 50mg QHS PRN for insomnia Pt is a 32 y/o AA single MSM, domiciled alone, with PMHx HIV (sexually contracted, dx 2007, last CD4 360s, VLUD as of May 2019 per patient, on Biktarvy), unclear prior psychiatric diagnosis (possible anxiety, possible Bipolar disorder), ongoing IV crystal meth use (last use two weeks ago),  presenting to [facility] ED with complaints of R foot pain and swelling for the last 8 days.    Pt presents with fast, softer, difficult to interrupt speech but calmer mood than before and requested for increase in his risperidone. Differential includes hypomanic episode vs anxiety. Pt is endorsing questionable PI ideation about his housing. However we will need to corroborate this with outpatient SW. There is no evidence of agitation or AVH/HI/SI. Pt demonstrates adequate understanding of his current medical issues, cooperative with care.    Plan:  -Increase Risperidone to 2mg PO daily  -Continue Risperidone to 2mg PO nightly  -Continue Acetaminophen 650mg Q6hrs PO PRN for pain  -Continue Hydroxyzine 25mg BID PO PRN for anxiety   -Continue Lorazepam 2mg QID PO PRN for agitation   -Continue Nicotine gum 2mg Q2hrs PO PRN for smoking urge  -Continue Trazodone 50mg QHS PO PRN for insomnia Pt is a 34 y/o AA single MSM, domiciled alone, with PMHx HIV (sexually contracted, dx 2007, last CD4 360s, VLUD as of May 2019 per patient, on Biktarvy), unclear prior psychiatric diagnosis (possible anxiety, possible Bipolar disorder), ongoing IV crystal meth use (last use two weeks ago),  presenting to [facility] ED with complaints of R foot pain and swelling for the last 8 days.    Pt presents with fast, softer, difficult to interrupt speech but calmer mood than before and requested for increase in his risperidone. Differential includes hypomanic episode vs anxiety. Pt is endorsing questionable PI ideation about his housing. However we will need to corroborate this with outpatient SW. There is no evidence of agitation or AVH/HI/SI. Pt demonstrates adequate understanding of his current medical issues, cooperative with care.  9/4: Pt reports improved mood, sleep, and clarity of thinking with risperidone; agreeable to uptitration of dose; no SI/HI/AVH.    Plan:  -Increase Risperidone to 2mg PO BID  -Continue Acetaminophen 650mg Q6hrs PO PRN for pain  -Continue Hydroxyzine 25mg BID PO PRN for anxiety   -Continue Lorazepam 2mg QID PO PRN for agitation   -Continue Nicotine gum 2mg Q2hrs PO PRN for smoking urge  -Continue Trazodone 50mg QHS PO PRN for insomnia

## 2019-09-04 NOTE — CHART NOTE - NSCHARTNOTEFT_GEN_A_CORE
33M PMH HIV (dx'd 2007, CD4 this admission 265, on Biktarvy but did not take for several weeks due to running out, follows at [facility]), IVDU (injects crystal meth, last 3 wks ago), anxiety, ?bipolar disorder (on Risperdal) presenting to [facility] ED with complaints of R foot pain and swelling x 8 days with recent I&D done at [facility]. Prior to that had been admited to inpatient psych for anxiety attack. Was unhappy with care at [facility] (transferred there due to no beds available at [facility]), and left to be further evaluated at [facility]. CT leg without evidence of osteomyelitis. s/p I&Dx2 with podiatry. Initiated on Vancomycin and Unasyn while inpatient with Unasyn d/c;d. Multiple attempts to elope and ultimately placed on 1:1. Seroquel, Haldol and Ativan given during admission due to agitation. To continue PO Bactrim x 2 wks. Syphilis Ab positive and on PCN G IM injections. Admitted to psych for manic symptoms. Medicine consulted for management of MRSA.    PMH: HIV, anxiety, psychiatric issues?, substance abuse  PSH: I&D  FH: HIV (mother), substance abuse (cocaine)  SH: marijuana, tobacco use, IV crystal meth use  Home Meds: Biktarvy, Bactrim 1DS tab daily    VITAL SIGNS:  Vital Signs Last 24 Hrs  T(C): 36.6 (04 Sep 2019 06:00), Max: 36.9 (03 Sep 2019 20:32)  T(F): 97.8 (04 Sep 2019 06:00), Max: 98.5 (03 Sep 2019 20:32)  HR: 98 (04 Sep 2019 11:05) (93 - 101)  BP: 124/65 (04 Sep 2019 11:05) (107/69 - 128/66)  BP(mean): --  RR: 17 (04 Sep 2019 11:05) (17 - 18)  SpO2: 100% (04 Sep 2019 11:05) (98% - 100%)    PHYSICAL EXAM:    Refused to be physically examined    MEDICATIONS:  MEDICATIONS  (STANDING):  bictegravir 50 mG/emtricitabine 200 mG/tenofovir alafenamide 25 mG (BIKTARVY) 1 Tablet(s) Oral daily  chlorhexidine 2% Cloths 1 Application(s) Topical daily  risperiDONE   Tablet 2 milliGRAM(s) Oral two times a day  trimethoprim  160 mG/sulfamethoxazole 800 mG 1 Tablet(s) Oral two times a day    MEDICATIONS  (PRN):  acetaminophen   Tablet .. 650 milliGRAM(s) Oral every 6 hours PRN Moderate Pain (4 - 6)  haloperidol     Tablet 5 milliGRAM(s) Oral four times a day PRN Agitation  hydrOXYzine hydrochloride 25 milliGRAM(s) Oral two times a day PRN Anxiety  LORazepam     Tablet 2 milliGRAM(s) Oral four times a day PRN Agitation  nicotine  Polacrilex Gum 2 milliGRAM(s) Oral every 2 hours PRN Urge to smoke  traZODone 50 milliGRAM(s) Oral at bedtime PRN Insomnia      ALLERGIES:  Allergies    No Known Allergies    Intolerances        LABS:              CAPILLARY BLOOD GLUCOSE          RADIOLOGY & ADDITIONAL TESTS: Reviewed.    A/P: 33M PMH HIV on Biktarvy CD4 264, IVDU crystal meth, last 3 wks ago), anxiety, admitted for RLE MRSA abscess, admitted to psych inpatient for manic episodes. Medicine consulted for MRSA RLE.    #MRSA abscess RLE  No changes in symptoms; pt also denies any changes. No f/c, pain. VSS.  - c/w Bactrim PO DS BID x total 2 wks  - no need for labs    #HIV  - CD4 265; VL never done  - c/w Biktarvy    #manic episode  - management as per psych  - c/w Risperdal 2mg BID  - trazodone PRN for sleep  - Ativan 2mg, Haldol 5, hydroxyzine 25 PRN for agitation    #smoking cessation  - c/w nicotine gum 33M PMH HIV (dx'd 2007, CD4 this admission 265, on Biktarvy but did not take for several weeks due to running out, follows at [facility]), IVDU (injects crystal meth, last 3 wks ago), anxiety, ?bipolar disorder (on Risperdal) presenting to [facility] ED with complaints of R foot pain and swelling x 8 days with recent I&D done at [facility]. Prior to that had been admited to inpatient psych for anxiety attack. Was unhappy with care at [facility] (transferred there due to no beds available at [facility]), and left to be further evaluated at [facility]. CT leg without evidence of osteomyelitis. s/p I&Dx2 with podiatry. Initiated on Vancomycin and Unasyn while inpatient with Unasyn d/c;d. Multiple attempts to elope and ultimately placed on 1:1. Seroquel, Haldol and Ativan given during admission due to agitation. To continue PO Bactrim x 2 wks. Syphilis Ab positive and on PCN G IM injections. Admitted to psych for manic symptoms. Medicine consulted for management of MRSA.    PMH: HIV, anxiety, psychiatric issues?, substance abuse  PSH: I&D  FH: HIV (mother), substance abuse (cocaine)  SH: marijuana, tobacco use, IV crystal meth use  Home Meds: Biktarvy, Bactrim 1DS tab daily    VITAL SIGNS:  Vital Signs Last 24 Hrs  T(C): 36.6 (04 Sep 2019 06:00), Max: 36.9 (03 Sep 2019 20:32)  T(F): 97.8 (04 Sep 2019 06:00), Max: 98.5 (03 Sep 2019 20:32)  HR: 98 (04 Sep 2019 11:05) (93 - 101)  BP: 124/65 (04 Sep 2019 11:05) (107/69 - 128/66)  BP(mean): --  RR: 17 (04 Sep 2019 11:05) (17 - 18)  SpO2: 100% (04 Sep 2019 11:05) (98% - 100%)    PHYSICAL EXAM:    RLE: improvement in abscess on dorsum of foot between 4/5 digits with no purulence; no erythema    MEDICATIONS:  MEDICATIONS  (STANDING):  bictegravir 50 mG/emtricitabine 200 mG/tenofovir alafenamide 25 mG (BIKTARVY) 1 Tablet(s) Oral daily  chlorhexidine 2% Cloths 1 Application(s) Topical daily  risperiDONE   Tablet 2 milliGRAM(s) Oral two times a day  trimethoprim  160 mG/sulfamethoxazole 800 mG 1 Tablet(s) Oral two times a day    MEDICATIONS  (PRN):  acetaminophen   Tablet .. 650 milliGRAM(s) Oral every 6 hours PRN Moderate Pain (4 - 6)  haloperidol     Tablet 5 milliGRAM(s) Oral four times a day PRN Agitation  hydrOXYzine hydrochloride 25 milliGRAM(s) Oral two times a day PRN Anxiety  LORazepam     Tablet 2 milliGRAM(s) Oral four times a day PRN Agitation  nicotine  Polacrilex Gum 2 milliGRAM(s) Oral every 2 hours PRN Urge to smoke  traZODone 50 milliGRAM(s) Oral at bedtime PRN Insomnia      ALLERGIES:  Allergies    No Known Allergies    Intolerances        LABS:              CAPILLARY BLOOD GLUCOSE          RADIOLOGY & ADDITIONAL TESTS: Reviewed.    A/P: 33M PMH HIV on Biktarvy CD4 264, IVDU crystal meth, last 3 wks ago), anxiety, admitted for RLE MRSA abscess, admitted to psych inpatient for manic episodes. Medicine consulted for known MRSA RLE.    #MRSA abscess RLE  No changes in symptoms; pt also denies any changes. No f/c, pain. VSS.  - c/w Bactrim PO DS BID x total 2 wks    #tachycardia  Noted to be in sinus tachycardia 8/30  - obtain repeat EKG     #HIV  - CD4 265; VL never done  - c/w Biktarvy    #manic episode  - management as per psych  - c/w Risperdal 2mg BID  - trazodone PRN for sleep  - Ativan 2mg, Haldol 5, hydroxyzine 25 PRN for agitation    #smoking cessation  - c/w nicotine gum 33M PMH HIV (dx'd 2007, CD4 this admission 265, on Biktarvy but did not take for several weeks due to running out, follows at [facility]), IVDU (injects crystal meth, last 3 wks ago), anxiety, ?bipolar disorder (on Risperdal) presenting to [facility] ED with complaints of R foot pain and swelling x 8 days with recent I&D done at [facility]. Prior to that had been admited to inpatient psych for anxiety attack. Was unhappy with care at [facility] (transferred there due to no beds available at [facility]), and left to be further evaluated at [facility]. CT leg without evidence of osteomyelitis. s/p I&Dx2 with podiatry. Initiated on Vancomycin and Unasyn while inpatient with Unasyn d/c;d. Multiple attempts to elope and ultimately placed on 1:1. Seroquel, Haldol and Ativan given during admission due to agitation. To continue PO Bactrim x 2 wks. Syphilis Ab positive and on PCN G IM injections. Admitted to psych for manic symptoms. Medicine consulted for management of MRSA.    PMH: HIV, anxiety, psychiatric issues?, substance abuse  PSH: I&D  FH: HIV (mother), substance abuse (cocaine)  SH: marijuana, tobacco use, IV crystal meth use  Home Meds: Biktarvy, Bactrim 1DS tab daily    VITAL SIGNS:  Vital Signs Last 24 Hrs  T(C): 36.6 (04 Sep 2019 06:00), Max: 36.9 (03 Sep 2019 20:32)  T(F): 97.8 (04 Sep 2019 06:00), Max: 98.5 (03 Sep 2019 20:32)  HR: 98 (04 Sep 2019 11:05) (93 - 101)  BP: 124/65 (04 Sep 2019 11:05) (107/69 - 128/66)  BP(mean): --  RR: 17 (04 Sep 2019 11:05) (17 - 18)  SpO2: 100% (04 Sep 2019 11:05) (98% - 100%)    PHYSICAL EXAM:    RLE: improvement in abscess on dorsum of foot between 4/5 digits with no purulence; no erythema    MEDICATIONS:  MEDICATIONS  (STANDING):  bictegravir 50 mG/emtricitabine 200 mG/tenofovir alafenamide 25 mG (BIKTARVY) 1 Tablet(s) Oral daily  chlorhexidine 2% Cloths 1 Application(s) Topical daily  risperiDONE   Tablet 2 milliGRAM(s) Oral two times a day  trimethoprim  160 mG/sulfamethoxazole 800 mG 1 Tablet(s) Oral two times a day    MEDICATIONS  (PRN):  acetaminophen   Tablet .. 650 milliGRAM(s) Oral every 6 hours PRN Moderate Pain (4 - 6)  haloperidol     Tablet 5 milliGRAM(s) Oral four times a day PRN Agitation  hydrOXYzine hydrochloride 25 milliGRAM(s) Oral two times a day PRN Anxiety  LORazepam     Tablet 2 milliGRAM(s) Oral four times a day PRN Agitation  nicotine  Polacrilex Gum 2 milliGRAM(s) Oral every 2 hours PRN Urge to smoke  traZODone 50 milliGRAM(s) Oral at bedtime PRN Insomnia      ALLERGIES:  Allergies    No Known Allergies    Intolerances        LABS:              CAPILLARY BLOOD GLUCOSE          RADIOLOGY & ADDITIONAL TESTS: Reviewed.    A/P: 33M PMH HIV on Biktarvy CD4 264, IVDU crystal meth, last 3 wks ago), anxiety, admitted for RLE MRSA abscess, admitted to psych inpatient for manic episodes. Medicine consulted for known MRSA RLE.    #MRSA abscess RLE  No changes in symptoms; pt also denies any changes. No f/c, pain. VSS.  - c/w Bactrim PO DS BID x total 2 wks    #tachycardia  Noted to be in sinus tachycardia 8/30  - obtain repeat EKG     #HIV  - CD4 265; VL never done  - c/w Biktarvy    #manic episode  - management as per psych  - c/w Risperdal 2mg BID  - trazodone PRN for sleep  - Ativan 2mg, Haldol 5, hydroxyzine 25 PRN for agitation    #smoking cessation  - c/w nicotine gum    Medicine service will sign off  Please reconsult with any new questions. 33M PMH HIV (dx'd 2007, CD4 this admission 265, on Biktarvy but did not take for several weeks due to running out, follows at [facility]), IVDU (injects crystal meth, last 3 wks ago), anxiety, ?bipolar disorder (on Risperdal) presenting to [facility] ED with complaints of R foot pain and swelling x 8 days with recent I&D done at [facility]. Prior to that had been admited to inpatient psych for anxiety attack. Was unhappy with care at [facility] (transferred there due to no beds available at [facility]), and left to be further evaluated at [facility]. CT leg without evidence of osteomyelitis. s/p I&Dx2 with podiatry. Initiated on Vancomycin and Unasyn while inpatient with Unasyn d/c;d. Multiple attempts to elope and ultimately placed on 1:1. Seroquel, Haldol and Ativan given during admission due to agitation. To continue PO Bactrim x 2 wks. Syphilis Ab positive and on PCN G IM injections. Admitted to psych for manic symptoms. Medicine consulted for management of MRSA.    PMH: HIV, anxiety, psychiatric issues?, substance abuse  PSH: I&D  FH: HIV (mother), substance abuse (cocaine)  SH: marijuana, tobacco use, IV crystal meth use  Home Meds: Biktarvy, Bactrim 1DS tab daily  Allergies: no known allergies    VITAL SIGNS:  Vital Signs Last 24 Hrs  T(C): 36.6 (04 Sep 2019 06:00), Max: 36.9 (03 Sep 2019 20:32)  T(F): 97.8 (04 Sep 2019 06:00), Max: 98.5 (03 Sep 2019 20:32)  HR: 98 (04 Sep 2019 11:05) (93 - 101)  BP: 124/65 (04 Sep 2019 11:05) (107/69 - 128/66)  BP(mean): --  RR: 17 (04 Sep 2019 11:05) (17 - 18)  SpO2: 100% (04 Sep 2019 11:05) (98% - 100%)    PHYSICAL EXAM:    RLE: improvement in abscess on dorsum of foot between 4/5 digits with no purulence; no erythema    MEDICATIONS:  MEDICATIONS  (STANDING):  bictegravir 50 mG/emtricitabine 200 mG/tenofovir alafenamide 25 mG (BIKTARVY) 1 Tablet(s) Oral daily  chlorhexidine 2% Cloths 1 Application(s) Topical daily  risperiDONE   Tablet 2 milliGRAM(s) Oral two times a day  trimethoprim  160 mG/sulfamethoxazole 800 mG 1 Tablet(s) Oral two times a day    MEDICATIONS  (PRN):  acetaminophen   Tablet .. 650 milliGRAM(s) Oral every 6 hours PRN Moderate Pain (4 - 6)  haloperidol     Tablet 5 milliGRAM(s) Oral four times a day PRN Agitation  hydrOXYzine hydrochloride 25 milliGRAM(s) Oral two times a day PRN Anxiety  LORazepam     Tablet 2 milliGRAM(s) Oral four times a day PRN Agitation  nicotine  Polacrilex Gum 2 milliGRAM(s) Oral every 2 hours PRN Urge to smoke  traZODone 50 milliGRAM(s) Oral at bedtime PRN Insomnia      ALLERGIES:  Allergies    No Known Allergies    Intolerances        LABS:              CAPILLARY BLOOD GLUCOSE          RADIOLOGY & ADDITIONAL TESTS: Reviewed.    A/P: 33M PMH HIV on Biktarvy CD4 264, IVDU crystal meth, last 3 wks ago), anxiety, admitted for RLE MRSA abscess, admitted to psych inpatient for manic episodes. Medicine consulted for known MRSA RLE.    #MRSA abscess RLE  No changes in symptoms; pt also denies any changes. No f/c, pain. VSS.  - c/w Bactrim PO DS BID x total 2 wks    #tachycardia  Noted to be in sinus tachycardia 8/30  - obtain repeat EKG     #HIV  - CD4 265; VL never done  - c/w Biktarvy    #manic episode  - management as per psych  - c/w Risperdal 2mg BID  - trazodone PRN for sleep  - Ativan 2mg, Haldol 5, hydroxyzine 25 PRN for agitation    #smoking cessation  - c/w nicotine gum    Medicine service will sign off  Please reconsult with any new questions. 33M PMH HIV (dx'd 2007, CD4 this admission 265, on Biktarvy but did not take for several weeks due to running out, follows at [facility]), IVDU (injects crystal meth, last 3 wks ago), anxiety, ?bipolar disorder (on Risperdal) presenting to [facility] ED with complaints of R foot pain and swelling x 8 days with recent I&D done at [facility]. Prior to that had been admited to inpatient psych for anxiety attack. Was unhappy with care at [facility] (transferred there due to no beds available at [facility]), and left to be further evaluated at [facility]. CT leg without evidence of osteomyelitis. s/p I&Dx2 with podiatry. Initiated on Vancomycin and Unasyn while inpatient with Unasyn d/c;d. Multiple attempts to elope and ultimately placed on 1:1. Seroquel, Haldol and Ativan given during admission due to agitation. To continue PO Bactrim x 2 wks. Syphilis Ab positive and on PCN G IM injections. Admitted to psych for manic symptoms. Medicine consulted for management of MRSA.    Subjective: Denies pain in foot. 12 pt ROS is negative.     PMH: HIV, anxiety, psychiatric issues?, substance abuse  PSH: I&D  FH: HIV (mother), substance abuse (cocaine)  SH: marijuana, tobacco use, IV crystal meth use  Home Meds: Biktarvy, Bactrim 1DS tab daily  Allergies: no known allergies    VITAL SIGNS:  Vital Signs Last 24 Hrs  T(C): 36.6 (04 Sep 2019 06:00), Max: 36.9 (03 Sep 2019 20:32)  T(F): 97.8 (04 Sep 2019 06:00), Max: 98.5 (03 Sep 2019 20:32)  HR: 98 (04 Sep 2019 11:05) (93 - 101)  BP: 124/65 (04 Sep 2019 11:05) (107/69 - 128/66)  BP(mean): --  RR: 17 (04 Sep 2019 11:05) (17 - 18)  SpO2: 100% (04 Sep 2019 11:05) (98% - 100%)    PHYSICAL EXAM:    RLE: improvement in abscess on dorsum of foot between 4/5 digits with no purulence; no erythema    MEDICATIONS:  MEDICATIONS  (STANDING):  bictegravir 50 mG/emtricitabine 200 mG/tenofovir alafenamide 25 mG (BIKTARVY) 1 Tablet(s) Oral daily  chlorhexidine 2% Cloths 1 Application(s) Topical daily  risperiDONE   Tablet 2 milliGRAM(s) Oral two times a day  trimethoprim  160 mG/sulfamethoxazole 800 mG 1 Tablet(s) Oral two times a day    MEDICATIONS  (PRN):  acetaminophen   Tablet .. 650 milliGRAM(s) Oral every 6 hours PRN Moderate Pain (4 - 6)  haloperidol     Tablet 5 milliGRAM(s) Oral four times a day PRN Agitation  hydrOXYzine hydrochloride 25 milliGRAM(s) Oral two times a day PRN Anxiety  LORazepam     Tablet 2 milliGRAM(s) Oral four times a day PRN Agitation  nicotine  Polacrilex Gum 2 milliGRAM(s) Oral every 2 hours PRN Urge to smoke  traZODone 50 milliGRAM(s) Oral at bedtime PRN Insomnia      ALLERGIES:  Allergies    No Known Allergies    Intolerances        LABS:              CAPILLARY BLOOD GLUCOSE          RADIOLOGY & ADDITIONAL TESTS: Reviewed.    A/P: 33M PMH HIV on Biktarvy CD4 264, IVDU crystal meth, last 3 wks ago), anxiety, admitted for RLE MRSA abscess, admitted to psych inpatient for manic episodes. Medicine consulted for known MRSA RLE.    #MRSA abscess RLE  No changes in symptoms; pt also denies any changes. No f/c, pain. VSS.  - c/w Bactrim PO DS BID x total 2 wks    #tachycardia  Noted to be in sinus tachycardia 8/30  - obtain repeat EKG     #HIV  - CD4 265; VL never done  - c/w Biktarvy    #manic episode  - management as per psych  - c/w Risperdal 2mg BID  - trazodone PRN for sleep  - Ativan 2mg, Haldol 5, hydroxyzine 25 PRN for agitation    #smoking cessation  - c/w nicotine gum    Medicine service will sign off  Please reconsult with any new questions.    Attending addendum:  Pt seen and examined at bedside. VS and pertinent labs reviewed. Case discussed with Dr. Quinn, agree with history, subjective, physical exam, assessment and plan.

## 2019-09-04 NOTE — PROGRESS NOTE BEHAVIORAL HEALTH - RISK ASSESSMENT
chronic risk chronic risk    Static: male gender, chronic medical problem: HIV+, history of IV crystal meth use,    Modifiable: paranoia, anxiety, substance abuse, MRSA cellulitis of abscess of the right foot     Protective: supportive family relationships, housing stability,      Modifiable: treatment of psychotic sxs; treat underlying mood issues; help  focus on personal goals and structurd daily activities; assess/address treatment compliance and efficacy issues; assist engaging possible support network;     Protective: cognitively able to engage in treatment; may have involved family;   Modifiable factors addressed in treatment plan; see summary and interval data for updates  Will continue to address modifiable risk factors including anxiety, substance abuse, medical treatment of cellulitis of the right foot during hospitalization with psychopharmacological interventions and plan for continued outpt psychiatric and substance abuse tx. chronic risk  Static: male gender, chronic psychiatric issue, chronic medical problem: HIV+, history of drug abuse  Modifiable: anxiety, housing stability, substance abuse   Protective: cognitively able to engage in treatment; may have involved family;    Will continue to address modifiable risk factors including anxiety, substance abuse, medical treatment during hospitalization with psychopharmacological interventions and plan for continued outpt psychiatric and substance abuse tx. chronic risk  Static: male gender, chronic psychiatric issue, chronic medical problem: HIV+, history of drug abuse  Modifiable: anxiety, housing stability, substance abuse   Protective: cognitively able to engage in treatment; may have involved family;    Will continue to address modifiable risk factors including anxiety, substance abuse, medical treatment during hospitalization with psychopharmacological interventions and plan for continued outpt psychiatric and substance abuse tx.  ---xxx  Risk elements: history of schizoaffective illness; possible manic history; history of bipolar disorder; not working or having regular daily routine; appears able to comprehend situation; recent inpatient admission(s); -mother;   At time of admission suicide risk was HIGH.    Static: chronic psychiatric disorder; mood issues; mood episodes; may be under financial stress; chronic psychiatric issues;     Modifiable: treatment of psychotic sxs; treat underlying mood issues; help  focus on personal goals and structurd daily activities; assess/address treatment compliance and efficacy issues; assist engaging possible support network;     Protective: cognitively able to engage in treatment; may have involved family;   Modifiable factors addressed in treatment plan; see summary and interval data for updates    Estimated length of stay based on admission data is 19 days. Estimated discharge date is/was: 09/18/19.

## 2019-09-04 NOTE — PROGRESS NOTE BEHAVIORAL HEALTH - NSBHCHARTREVIEWVS_PSY_A_CORE FT
Vital Signs Last 24 Hrs  T(C): 36.6 (04 Sep 2019 06:00), Max: 36.9 (03 Sep 2019 20:32)  T(F): 97.8 (04 Sep 2019 06:00), Max: 98.5 (03 Sep 2019 20:32)  HR: 93 (04 Sep 2019 06:00) (92 - 101)  BP: 118/76 (04 Sep 2019 06:00) (107/69 - 128/66)  BP(mean): --  RR: 18 (04 Sep 2019 06:00) (18 - 20)  SpO2: 99% (04 Sep 2019 06:00) (98% - 100%) Vital Signs Last 24 Hrs  T(C): 36.6 (04 Sep 2019 06:00), Max: 36.9 (03 Sep 2019 20:32)  T(F): 97.8 (04 Sep 2019 06:00), Max: 98.5 (03 Sep 2019 20:32)  HR: 98 (04 Sep 2019 11:05) (93 - 101)  BP: 124/65 (04 Sep 2019 11:05) (107/69 - 128/66)  BP(mean): --  RR: 17 (04 Sep 2019 11:05) (17 - 18)  SpO2: 100% (04 Sep 2019 11:05) (98% - 100%)

## 2019-09-05 PROCEDURE — 99232 SBSQ HOSP IP/OBS MODERATE 35: CPT

## 2019-09-05 RX ADMIN — Medication 2 MILLIGRAM(S): at 22:14

## 2019-09-05 RX ADMIN — Medication 650 MILLIGRAM(S): at 16:48

## 2019-09-05 RX ADMIN — Medication 2 MILLIGRAM(S): at 15:49

## 2019-09-05 RX ADMIN — RISPERIDONE 2 MILLIGRAM(S): 4 TABLET ORAL at 22:12

## 2019-09-05 RX ADMIN — Medication 50 MILLIGRAM(S): at 22:11

## 2019-09-05 RX ADMIN — CHLORHEXIDINE GLUCONATE 1 APPLICATION(S): 213 SOLUTION TOPICAL at 11:44

## 2019-09-05 RX ADMIN — Medication 1 TABLET(S): at 11:44

## 2019-09-05 RX ADMIN — BICTEGRAVIR SODIUM, EMTRICITABINE, AND TENOFOVIR ALAFENAMIDE FUMARATE 1 TABLET(S): 30; 120; 15 TABLET ORAL at 11:44

## 2019-09-05 RX ADMIN — Medication 650 MILLIGRAM(S): at 15:48

## 2019-09-05 RX ADMIN — RISPERIDONE 2 MILLIGRAM(S): 4 TABLET ORAL at 11:44

## 2019-09-05 RX ADMIN — Medication 650 MILLIGRAM(S): at 23:00

## 2019-09-05 RX ADMIN — Medication 1 TABLET(S): at 22:11

## 2019-09-05 RX ADMIN — Medication 650 MILLIGRAM(S): at 22:19

## 2019-09-05 NOTE — PROGRESS NOTE BEHAVIORAL HEALTH - RISK ASSESSMENT
chronic risk  Static: male gender, chronic psychiatric issue, chronic medical problem: HIV+, history of drug abuse  Modifiable: anxiety, housing stability, substance abuse   Protective: cognitively able to engage in treatment; may have involved family;    Will continue to address modifiable risk factors including anxiety, substance abuse, medical treatment during hospitalization with psychopharmacological interventions and plan for continued outpt psychiatric and substance abuse tx.  ---xxx  Risk elements: history of schizoaffective illness; possible manic history; history of bipolar disorder; not working or having regular daily routine; appears able to comprehend situation; recent inpatient admission(s); -mother;   At time of admission suicide risk was HIGH.    Static: chronic psychiatric disorder; mood issues; mood episodes; may be under financial stress; chronic psychiatric issues;     Modifiable: treatment of psychotic sxs; treat underlying mood issues; help  focus on personal goals and structurd daily activities; assess/address treatment compliance and efficacy issues; assist engaging possible support network;     Protective: cognitively able to engage in treatment; may have involved family;   Modifiable factors addressed in treatment plan; see summary and interval data for updates    Estimated length of stay based on admission data is 19 days. Estimated discharge date is/was: 09/18/19. chronic risk  Static: male gender, chronic psychiatric issue, chronic medical problem: HIV+, history of drug abuse  Modifiable: anxiety, housing stability, substance abuse   Protective: cognitively able to engage in treatment; may have involved family;    Will continue to address modifiable risk factors including anxiety, substance abuse, medical treatment during hospitalization with psychopharmacological interventions and plan for continued outpt psychiatric and substance abuse tx.  ---xxx  Risk elements: history of schizoaffective illness; possible manic history; history of bipolar disorder; not working or having regular daily routine; appears able to comprehend situation; recent inpatient admission(s); -mother;   At time of admission suicide risk was HIGH.    Static: chronic psychiatric disorder; mood issues; mood episodes; may be under financial stress; chronic psychiatric issues;     Modifiable: treatment of psychotic sxs; treat underlying mood issues; help focus on personal goals and structure daily activities; assess/address treatment compliance and efficacy issues; assist engaging possible support network;     Protective: cognitively able to engage in treatment; may have involved family;   Modifiable factors addressed in treatment plan; see summary and interval data for updates    Estimated length of stay based on admission data is 19 days. Estimated discharge date is/was: 09/18/19.

## 2019-09-05 NOTE — PROGRESS NOTE BEHAVIORAL HEALTH - NSBHFUPINTERVALHXFT_PSY_A_CORE
This morning, the pt notes that the medication makes him "feel great and stabilizes my moods. My mood feels good". Pt reports that he ate his meals, cleaned his room, and exercised to keep his "circulation running" which is why his body feels "achy" today. He states he slept well and had a total of 8.5hrs of sleep last night. Pt also states that he is irritated that he is being "held here longer than is needed and I have lots to do outside. My (air)flights are wasted, apartment's a mess, and sewage work". The pt also notes that his foot is continuing to improve.     Per staff, the pt was less labile, calmer in general, and had his behavioral controlled. The pt had his dressing changed, slept most of the night, and medications taken without complaints with a dose of Trazadone and acetaminophen taken as PRN last night. This morning, the pt notes that the medication makes him "feel great and stabilizes my moods. My mood feels good". Pt reports that he ate his meals, cleaned his room, and exercised to keep his "circulation running" which is why his body feels "achy" today but not tired. He states he slept well and had a total of 8.5hrs of sleep last night. Pt also states that he is irritated that he is being "held here longer than is needed and I have lots to do outside. My (air)flights are wasted, apartment's a mess, and sewage work" but does not feel the need to "use the energy to get angry". The pt also notes that his foot is continuing to improve.     Per staff, the pt was less labile, calmer in general, and had his behavioral controlled. The pt had his dressing changed, slept most of the night, and medications taken without complaints with Trazadone, acetaminophen, and Nicotine taken as PRN last night.

## 2019-09-05 NOTE — PROGRESS NOTE BEHAVIORAL HEALTH - ESTIMATED INTELLIGENCE
PAT - SURGICAL PRE-ADMISSION INSTRUCTIONS    NAME:  Feliz Bunch                                                          TODAY'S DATE:  5/18/2018    SURGERY DATE:  6/14/2018                                  SURGERY ARRIVAL TIME:   0700    1. Do NOT eat or drink anything, including candy or gum, after MIDNIGHT on 6/13/18 , unless you have specific instructions from your Surgeon or Anesthesia Provider to do so. 2. No smoking on the day of surgery. 3. No alcohol 24 hours prior to the day of surgery. 4. No recreational drugs for one week prior to the day of surgery. 5. Leave all valuables, including money/purse, at home. 6. Remove all jewelry, nail polish, makeup (including mascara); no lotions, powders, deodorant, or perfume/cologne/after shave. 7. Glasses/Contact lenses and Dentures may be worn to the hospital.  They will be removed prior to surgery. 8. Call your doctor if symptoms of a cold or illness develop within 24 ours prior to surgery. 9. AN ADULT MUST DRIVE YOU HOME AFTER OUTPATIENT SURGERY. 10. If you are having an OUTPATIENT procedure, please make arrangements for a responsible adult to be with you for 24 hours after your surgery. 11. If you are admitted to the hospital, you will be assigned to a bed after surgery is complete. Normally a family member will not be able to see you until you are in your assigned bed. 15. Family is encouraged to accompany you to the hospital.  We ask visitors in the treatment area to be limited to ONE person at a time to ensure patient privacy. EXCEPTIONS WILL BE MADE AS NEEDED. 15. Children under 12 are discouraged from entering the treatment area and need to be supervised by an adult when in the waiting room. Special Instructions:    Bring list of CURRENT medications . , Take these medications the morning of surgery with a sip of water:  CARVEDILOL, SYMBICORT, STOP anticoagulants AT LEAST 1 WEEK PRIOR to your surgery or, follow other MD instructions: PLAVIX    Patient Prep:    use CHG solution    These surgical instructions were reviewed with PATIENT during the PAT VISIT. A printed copy of the instructions was provided to PATIENT. Directions: On the morning of surgery, please go to the [address] [facility]. Enter the building from the [facility] entrance, 1st floor (next to the Emergency Room entrance). Take the elevator to the 2nd floor. Sign in at the Registration Desk.     If you have any questions and/or concerns, please do not hesitate to call:  (Prior to the day of surgery)  [facility] unit:  [phone]  (Day of surgery)  [facility] unit:  [phone] Other

## 2019-09-05 NOTE — PROGRESS NOTE BEHAVIORAL HEALTH - NSBHCHARTREVIEWVS_PSY_A_CORE FT
Vital Signs Last 24 Hrs  T(C): 36.9 (05 Sep 2019 09:00), Max: 37.2 (04 Sep 2019 20:50)  T(F): 98.5 (05 Sep 2019 09:00), Max: 99 (04 Sep 2019 20:50)  HR: 104 (05 Sep 2019 09:00) (74 - 104)  BP: 133/78 (05 Sep 2019 09:00) (120/73 - 136/70)  BP(mean): --  RR: 18 (05 Sep 2019 09:00) (17 - 18)  SpO2: 97% (05 Sep 2019 09:00) (97% - 100%)

## 2019-09-05 NOTE — PROGRESS NOTE BEHAVIORAL HEALTH - SUMMARY
Pt is a 34 y/o AA single MSM, domiciled alone, with PMHx HIV (sexually contracted, dx 2007, last CD4 360s, VLUD as of May 2019 per patient, on Biktarvy), unclear prior psychiatric diagnosis (possible anxiety, possible Bipolar disorder), ongoing IV crystal meth use (last use two weeks ago),  presenting to [facility] ED with complaints of R foot pain and swelling for the last 8 days.    Pt presents with fast, softer, difficult to interrupt speech but calmer mood than before and requested for increase in his risperidone. Differential includes hypomanic episode vs anxiety. Pt is endorsing questionable PI ideation about his housing. However we will need to corroborate this with outpatient SW. There is no evidence of agitation or AVH/HI/SI. Pt demonstrates adequate understanding of his current medical issues, cooperative with care.  9/4: Pt reports improved mood, sleep, and clarity of thinking with risperidone; agreeable to uptitration of dose; no SI/HI/AVH.    Plan:  -Increase Risperidone to 2mg PO BID  -Continue Acetaminophen 650mg Q6hrs PO PRN for pain  -Continue Hydroxyzine 25mg BID PO PRN for anxiety   -Continue Lorazepam 2mg QID PO PRN for agitation   -Continue Nicotine gum 2mg Q2hrs PO PRN for smoking urge  -Continue Trazodone 50mg QHS PO PRN for insomnia Pt is a 34 y/o AA single MSM, domiciled alone, with PMHx HIV (sexually contracted, dx 2007, last CD4 360s, VLUD as of May 2019 per patient, on Biktarvy), unclear prior psychiatric diagnosis (possible anxiety, possible Bipolar disorder), ongoing IV crystal meth use (last use two weeks ago),  presenting to [facility] ED with complaints of R foot pain and swelling for the last 8 days.    Pt presents with fast, difficult to interrupt speech but calmer mood than before and on increased dose of risperidone 2mg BID. Differential includes hypomanic episode vs anxiety. Pt is endorsing questionable PI ideation about his housing. However we will need to corroborate this with outpatient SW. There is no evidence of agitation or AVH/HI/SI. Pt demonstrates adequate understanding of his current medical issues, cooperative with care.  9/5: Pt reports continued improved mood, sleep, and clarity of thinking with increased risperidone. No SI/HI/AVH.     Plan:  -Continue Risperidone 2mg PO BID  -Continue Acetaminophen 650mg Q6hrs PO PRN for pain  -Continue Hydroxyzine 25mg BID PO PRN for anxiety   -Continue Lorazepam 2mg QID PO PRN for agitation   -Continue Nicotine gum 2mg Q2hrs PO PRN for smoking urge  -Continue Trazodone 50mg QHS PO PRN for insomnia  -Care team will continue to psychoeducate the patient

## 2019-09-06 PROCEDURE — 99232 SBSQ HOSP IP/OBS MODERATE 35: CPT

## 2019-09-06 RX ORDER — BICTEGRAVIR SODIUM, EMTRICITABINE, AND TENOFOVIR ALAFENAMIDE FUMARATE 30; 120; 15 MG/1; MG/1; MG/1
1 TABLET ORAL DAILY
Refills: 0 | Status: DISCONTINUED | OUTPATIENT
Start: 2019-09-06 | End: 2019-09-11

## 2019-09-06 RX ORDER — CHLORHEXIDINE GLUCONATE 213 G/1000ML
1 SOLUTION TOPICAL DAILY
Refills: 0 | Status: DISCONTINUED | OUTPATIENT
Start: 2019-09-06 | End: 2019-09-11

## 2019-09-06 RX ORDER — PENICILLIN G BENZATHINE 1200000 [IU]/2ML
2400000 INJECTION, SUSPENSION INTRAMUSCULAR
Qty: 0 | Refills: 0 | DISCHARGE
Start: 2019-09-06 | End: 2019-09-13

## 2019-09-06 RX ADMIN — PENICILLIN G BENZATHINE 2.4 MILLION UNIT(S): 1200000 INJECTION, SUSPENSION INTRAMUSCULAR at 09:24

## 2019-09-06 RX ADMIN — Medication 50 MILLIGRAM(S): at 21:41

## 2019-09-06 RX ADMIN — Medication 2 MILLIGRAM(S): at 21:41

## 2019-09-06 RX ADMIN — Medication 1 TABLET(S): at 21:41

## 2019-09-06 RX ADMIN — Medication 1 TABLET(S): at 09:16

## 2019-09-06 RX ADMIN — RISPERIDONE 2 MILLIGRAM(S): 4 TABLET ORAL at 21:41

## 2019-09-06 RX ADMIN — RISPERIDONE 2 MILLIGRAM(S): 4 TABLET ORAL at 09:17

## 2019-09-06 RX ADMIN — CHLORHEXIDINE GLUCONATE 1 APPLICATION(S): 213 SOLUTION TOPICAL at 09:15

## 2019-09-06 RX ADMIN — BICTEGRAVIR SODIUM, EMTRICITABINE, AND TENOFOVIR ALAFENAMIDE FUMARATE 1 TABLET(S): 30; 120; 15 TABLET ORAL at 09:17

## 2019-09-06 NOTE — PROGRESS NOTE BEHAVIORAL HEALTH - NSBHFUPINTERVALHXFT_PSY_A_CORE
In the morning, the interview was done in bed with blankets wrapped about him and the pt was noted to be more guarded and abrupt in details but with good eye contact. The pt reported that he will be going to court today and states "I'm ready for it and to be released". He states "why am I here? This place makes me depressed, there is no life in here. I want to be outside breathing and to be myself". The pt notes that he is tired of talking to all the staff and feels misunderstood by some, which worries him about his release date getting pushed back. Otherwise, the pt reports that he slept for 8.5hrs, Tylenol helped with his foot pain, which he notes is greatly healed, and his body ache, which prevented him from exercising the last two nights. He also notes attending group meetings but that they are boring and the other patients produce foul odors from not bathing and the production of farts. Denies SI/HI/AH/VH.     Per night staff, the pt was primarily isolated in room and bed but did attending AA, meals and appeared to be generally pleasant and talkative but was paranoid that his right foot infection was not healing. The pt was observed to have slept in intervals, his behavior controlled, dressing changed, and medication was taken. Pt was also noted to have taken his PRN medications, which were Nicotine gum and Trazadone last night. In the morning, the interview was done in bed with blankets wrapped about him and the pt was noted to be more guarded and abrupt in details but with good eye contact. The pt reported that he will be going to court today and states "I'm ready for it and to be released". He states "why am I here? This place makes me depressed, there is no life in here. I want to be outside breathing and to be myself". The pt notes that he is tired of talking to all the staff and feels misunderstood by some, which worries him about his release date getting pushed back. Otherwise, the pt reports that he slept for 8.5hrs, Tylenol helped with his foot pain, which he notes is greatly healed, and his body ache, which prevented him from exercising the last two nights. He also notes attending group meetings but that they are boring and the other patients produce foul odors from not bathing and the production of farts. Denies SI/HI/AH/VH. The pt was brought to court today for reappeal the involuntary hold and the court approved of a released but ordered an retention of 7 additional days.     Per night staff, the pt was primarily isolated in room and bed but did attending AA, meals and appeared to be generally pleasant and talkative but was paranoid that his right foot infection was not healing. The pt was observed to have slept in intervals, his behavior controlled, dressing changed, and medication was taken. Pt was also noted to have taken his PRN medications, which were Nicotine gum and Trazadone last night. In the morning, patient was in bed with blankets wrapped about him and the pt was noted to be more guarded and abrupt in details but with good eye contact. The pt reported that he will be going to court today and states "I'm ready for it and to be released". He states "why am I here? This place makes me depressed, there is no life in here. I want to be outside breathing and to be myself". The pt notes that he is tired of talking to all the staff and feels misunderstood by some, which worries him about his release date getting pushed back. Otherwise, the pt reports that he slept for 8.5hrs, Tylenol helped with his foot pain, which he notes is greatly healed, and his body ache, which prevented him from exercising the last two nights. He also notes attending group meetings but that they are boring and the other patients smell bad. Denies SI/HI/AH/VH. The pt was brought to court today to appeal his involuntary admission and the court ordered that he should be discharged within 7 days.    Per staff, the pt was primarily isolated in room and bed but did attending AA, meals and appeared to be generally pleasant and talkative but was paranoid that his right foot infection was not healing. The pt was observed to have slept in intervals, his behavior controlled, dressing changed, and medication was taken. Pt was also noted to have taken PRN Nicotine gum and Trazadone last night.

## 2019-09-06 NOTE — PROGRESS NOTE BEHAVIORAL HEALTH - RISK ASSESSMENT
---xxx  Risk elements: history of schizoaffective illness; possible manic history; history of bipolar disorder; not working or having regular daily routine; appears able to comprehend situation; recent inpatient admission(s); -mother;   At time of admission suicide risk was HIGH.    Static: chronic psychiatric disorder; mood issues; mood episodes; may be under financial stress; chronic psychiatric issues;     Modifiable: treatment of psychotic sxs; treat underlying mood issues; help focus on personal goals and structure daily activities; assess/address treatment compliance and efficacy issues; assist engaging possible support network;     Protective: cognitively able to engage in treatment; may have involved family;   Modifiable factors addressed in treatment plan; see summary and interval data for updates    Estimated length of stay based on admission data is 19 days. Estimated discharge date is/was: 09/18/19.

## 2019-09-06 NOTE — PROGRESS NOTE BEHAVIORAL HEALTH - SUMMARY
Pt is a 34 y/o AA single MSM, domiciled alone, with PMHx HIV (sexually contracted, dx 2007, last CD4 360s, VLUD as of May 2019 per patient, on Biktarvy), unclear prior psychiatric diagnosis (possible anxiety, possible Bipolar disorder), ongoing IV crystal meth use (last use two weeks ago), presenting to [facility] ED with complaints of R foot pain and swelling for the last 8 days.    Pt presents with fast, difficult to interrupt speech but calmer mood than before and on increased dose of risperidone 2mg BID. Differential includes hypomanic episode vs anxiety. Pt is endorsing questionable PI ideation about his housing. However we will need to corroborate this with outpatient SW. There is no evidence of agitation or AVH/HI/SI. Pt demonstrates adequate understanding of his current medical issues, cooperative with care. 9/5: Pt reports continued improved mood, sleep, and clarity of thinking with increased risperidone. No SI/HI/AVH. 9/6: Pt reports depressed mood, but overall improvement with medication in terms of sleep and clarity of thinking.     Plan:  -Continue Risperidone 2mg PO BID for bi-polar disorder  -Continue Acetaminophen 650mg Q6hrs PO PRN for pain  -Continue Hydroxyzine 25mg BID PO PRN for anxiety   -Continue Lorazepam 2mg QID PO PRN for agitation   -Continue Nicotine gum 2mg Q2hrs PO PRN for smoking urge  -Continue Trazodone 50mg QHS PO PRN for insomnia  -Care team will continue to psychoeducate the patient  -Pt went to court on 9/6/19 to repeal involuntary hold and care team drafted discharge notes in preparation. Pt is a 32 y/o AA single MSM, domiciled alone, with PMHx HIV (sexually contracted, dx 2007, last CD4 360s, VLUD as of May 2019 per patient, on Biktarvy), unclear prior psychiatric diagnosis (possible anxiety, possible Bipolar disorder), ongoing IV crystal meth use (last use two weeks ago), presenting to [facility] ED with complaints of R foot pain and swelling for the last 8 days.    Pt presents with fast, difficult to interrupt speech but calmer mood than before and on increased dose of risperidone 2mg BID. Differential includes hypomanic episode vs anxiety. Pt is endorsing questionable PI ideation about his housing. However we will need to corroborate this with outpatient SW. There is no evidence of agitation or AVH/HI/SI. Pt demonstrates adequate understanding of his current medical issues, cooperative with care. 9/5: Pt reports continued improved mood, sleep, and clarity of thinking with increased risperidone. No SI/HI/AVH. 9/6: Pt reports depressed mood, but overall improvement with medication in terms of sleep and clarity of thinking. Pt also went to court to contest his involuntary admission.      Plan:  -Continue Risperidone 2mg PO BID for bi-polar disorder  -Continue Acetaminophen 650mg Q6hrs PO PRN for pain  -Continue Hydroxyzine 25mg BID PO PRN for anxiety   -Continue Lorazepam 2mg QID PO PRN for agitation   -Continue Nicotine gum 2mg Q2hrs PO PRN for smoking urge  -Continue Trazodone 50mg QHS PO PRN for insomnia  -Care team will continue to psychoeducate the patient Pt is a 34 y/o AA single MSM, domiciled alone, with PMHx HIV (sexually contracted, dx 2007, last CD4 360s, VLUD as of May 2019 per patient, on Biktarvy), unclear prior psychiatric diagnosis (possible anxiety, possible Bipolar disorder), ongoing IV crystal meth use (last use two weeks ago), presenting to [facility] ED with complaints of R foot pain and swelling for the last 8 days.    Pt presents with fast, difficult to interrupt speech but calmer mood than before and on increased dose of risperidone 2mg BID. Differential includes hypomanic episode vs anxiety. Pt is endorsing questionable PI ideation about his housing. However we will need to corroborate this with outpatient SW. There is no evidence of agitation or AVH/HI/SI. Pt demonstrates adequate understanding of his current medical issues, cooperative with care. 9/5: Pt reports continued improved mood, sleep, and clarity of thinking with increased risperidone. No SI/HI/AVH. 9/6: Pt reports depressed mood, but overall improvement with medication in terms of sleep and clarity of thinking. Pt also went to court to contest his involuntary admission and a release was approved but with an additional retention of 7 days. The pt understood the order and verbally agreed.     Plan:  -Continue Risperidone 2mg PO BID for bi-polar disorder  -Continue Acetaminophen 650mg Q6hrs PO PRN for pain  -Continue Hydroxyzine 25mg BID PO PRN for anxiety   -Continue Lorazepam 2mg QID PO PRN for agitation   -Continue Nicotine gum 2mg Q2hrs PO PRN for smoking urge  -Continue Trazodone 50mg QHS PO PRN for insomnia  -Care team will continue to psychoeducate the patient  -Per court, the pt will be released but after a retention of 7 days Pt is a 34 y/o AA single MSM, domiciled alone, with PMHx HIV (sexually contracted, dx 2007, last CD4 360s, VLUD as of May 2019 per patient, on Biktarvy), unclear prior psychiatric diagnosis (possible anxiety, possible Bipolar disorder), ongoing IV crystal meth use (last use two weeks ago), presenting to [facility] ED with complaints of R foot pain and swelling for the last 8 days.    Pt presents with fast, difficult to interrupt speech but calmer mood than before and on increased dose of risperidone 2mg BID. Differential includes hypomanic episode vs anxiety. Pt is endorsing questionable PI ideation about his housing. However we will need to corroborate this with outpatient SW. There is no evidence of agitation or AVH/HI/SI. Pt demonstrates adequate understanding of his current medical issues, cooperative with care. On current assessment, pt reports continued improved mood, sleep, and clarity of thinking with increased risperidone. Pt went to court on 9/6/19 to contest his involuntary admission and a discharge within 7 days was ordered.    Plan:  -Continue Risperidone 2mg PO BID for bipolar disorder  -Continue Acetaminophen 650mg Q6hrs PO PRN for pain  -Continue Hydroxyzine 25mg BID PO PRN for anxiety   -Continue Lorazepam 2mg QID PO PRN for agitation   -Continue Nicotine gum 2mg Q2hrs PO PRN for smoking urge  -Continue Trazodone 50mg QHS PO PRN for insomnia  -Care team will continue to psychoeducate the patient  -Per court, the pt will be discharged within 7 days

## 2019-09-06 NOTE — PROGRESS NOTE BEHAVIORAL HEALTH - NSBHCHARTREVIEWVS_PSY_A_CORE FT
Vital Signs Last 24 Hrs  T(C): 36.6 (05 Sep 2019 20:16), Max: 36.9 (05 Sep 2019 09:00)  T(F): 97.8 (05 Sep 2019 20:16), Max: 98.5 (05 Sep 2019 09:00)  HR: 105 (05 Sep 2019 20:16) (97 - 105)  BP: 122/67 (05 Sep 2019 20:16) (122/67 - 133/78)  BP(mean): --  RR: 18 (05 Sep 2019 20:16) (18 - 18)  SpO2: 98% (05 Sep 2019 20:16) (97% - 99%) Vital Signs Last 24 Hrs  T(C): 36.4 (06 Sep 2019 10:00), Max: 36.8 (05 Sep 2019 16:14)  T(F): 97.5 (06 Sep 2019 10:00), Max: 98.2 (05 Sep 2019 16:14)  HR: 101 (06 Sep 2019 10:00) (97 - 105)  BP: 130/67 (06 Sep 2019 10:00) (122/67 - 130/67)  BP(mean): --  RR: 18 (06 Sep 2019 10:00) (18 - 18)  SpO2: 100% (06 Sep 2019 10:00) (98% - 100%)

## 2019-09-07 RX ADMIN — Medication 1 TABLET(S): at 09:55

## 2019-09-07 RX ADMIN — Medication 650 MILLIGRAM(S): at 22:17

## 2019-09-07 RX ADMIN — CHLORHEXIDINE GLUCONATE 1 APPLICATION(S): 213 SOLUTION TOPICAL at 12:14

## 2019-09-07 RX ADMIN — Medication 650 MILLIGRAM(S): at 22:14

## 2019-09-07 RX ADMIN — RISPERIDONE 2 MILLIGRAM(S): 4 TABLET ORAL at 22:12

## 2019-09-07 RX ADMIN — Medication 2 MILLIGRAM(S): at 22:16

## 2019-09-07 RX ADMIN — RISPERIDONE 2 MILLIGRAM(S): 4 TABLET ORAL at 09:55

## 2019-09-07 RX ADMIN — BICTEGRAVIR SODIUM, EMTRICITABINE, AND TENOFOVIR ALAFENAMIDE FUMARATE 1 TABLET(S): 30; 120; 15 TABLET ORAL at 09:55

## 2019-09-07 RX ADMIN — Medication 50 MILLIGRAM(S): at 22:12

## 2019-09-07 RX ADMIN — Medication 1 TABLET(S): at 22:12

## 2019-09-07 RX ADMIN — Medication 2 MILLIGRAM(S): at 09:57

## 2019-09-08 RX ADMIN — Medication 1 TABLET(S): at 22:08

## 2019-09-08 RX ADMIN — Medication 1 TABLET(S): at 10:08

## 2019-09-08 RX ADMIN — BICTEGRAVIR SODIUM, EMTRICITABINE, AND TENOFOVIR ALAFENAMIDE FUMARATE 1 TABLET(S): 30; 120; 15 TABLET ORAL at 10:08

## 2019-09-08 RX ADMIN — Medication 2 MILLIGRAM(S): at 22:08

## 2019-09-08 RX ADMIN — RISPERIDONE 2 MILLIGRAM(S): 4 TABLET ORAL at 22:08

## 2019-09-08 RX ADMIN — Medication 2 MILLIGRAM(S): at 15:53

## 2019-09-08 RX ADMIN — CHLORHEXIDINE GLUCONATE 1 APPLICATION(S): 213 SOLUTION TOPICAL at 10:14

## 2019-09-08 RX ADMIN — RISPERIDONE 2 MILLIGRAM(S): 4 TABLET ORAL at 10:08

## 2019-09-09 DIAGNOSIS — A53.9 SYPHILIS, UNSPECIFIED: ICD-10-CM

## 2019-09-09 PROCEDURE — 99232 SBSQ HOSP IP/OBS MODERATE 35: CPT

## 2019-09-09 RX ADMIN — Medication 1 TABLET(S): at 22:14

## 2019-09-09 RX ADMIN — Medication 2 MILLIGRAM(S): at 18:01

## 2019-09-09 RX ADMIN — Medication 2 MILLIGRAM(S): at 07:17

## 2019-09-09 RX ADMIN — RISPERIDONE 2 MILLIGRAM(S): 4 TABLET ORAL at 11:23

## 2019-09-09 RX ADMIN — Medication 2 MILLIGRAM(S): at 22:14

## 2019-09-09 RX ADMIN — Medication 2 MILLIGRAM(S): at 11:25

## 2019-09-09 RX ADMIN — RISPERIDONE 2 MILLIGRAM(S): 4 TABLET ORAL at 22:14

## 2019-09-09 RX ADMIN — CHLORHEXIDINE GLUCONATE 1 APPLICATION(S): 213 SOLUTION TOPICAL at 13:00

## 2019-09-09 RX ADMIN — BICTEGRAVIR SODIUM, EMTRICITABINE, AND TENOFOVIR ALAFENAMIDE FUMARATE 1 TABLET(S): 30; 120; 15 TABLET ORAL at 11:24

## 2019-09-09 RX ADMIN — Medication 1 TABLET(S): at 11:23

## 2019-09-09 NOTE — PROGRESS NOTE BEHAVIORAL HEALTH - NSBHCHARTREVIEWVS_PSY_A_CORE FT
Vital Signs Last 24 Hrs  T(C): 36.6 (09 Sep 2019 06:38), Max: 37.1 (08 Sep 2019 17:00)  T(F): 97.8 (09 Sep 2019 06:38), Max: 98.7 (08 Sep 2019 17:00)  HR: 87 (09 Sep 2019 06:38) (87 - 108)  BP: 102/58 (09 Sep 2019 06:38) (102/58 - 116/72)  BP(mean): --  RR: 20 (08 Sep 2019 17:00) (20 - 20)  SpO2: 99% (09 Sep 2019 06:38) (99% - 99%)

## 2019-09-09 NOTE — PROGRESS NOTE BEHAVIORAL HEALTH - SUMMARY
Pt is a 32 y/o AA single MSM, domiciled alone, with PMHx HIV (sexually contracted, dx 2007, last CD4 360s, VLUD as of May 2019 per patient, on Biktarvy), unclear prior psychiatric diagnosis (possible anxiety, possible Bipolar disorder), ongoing IV crystal meth use (last use two weeks ago), presenting to [facility] ED with complaints of R foot pain and swelling for the last 8 days.    Pt presents with fast, difficult to interrupt speech but calmer mood than before and on risperidone 2mg BID. Differential includes hypomanic episode vs anxiety. Pt is endorsing questionable PI ideation about his housing. However we will need to corroborate this with outpatient SW. There is no evidence of agitation or AVH/HI/SI. Pt demonstrates adequate understanding of his current medical issues, cooperative with care. On current assessment, pt reports continued improved mood, sleep, and clarity of thinking with increased risperidone. Pt went to court on 9/6/19 to contest his involuntary admission and a discharge within 7 days was ordered.     Plan:  -Continue Risperidone 2mg PO BID for bipolar disorder  -Continue Acetaminophen 650mg Q6hrs PO PRN for pain  -Continue Hydroxyzine 25mg BID PO PRN for anxiety   -Continue Lorazepam 2mg QID PO PRN for agitation   -Continue Nicotine gum 2mg Q2hrs PO PRN for smoking urge  -Continue Trazodone 50mg QHS PO PRN for insomnia  -Care team will continue to psychoeducate the patient  -Per court, the pt will be discharged within 7 days  -Pt requested additional CD4 labs to be drawn, order will be placed and done before discharge Pt is a 34 y/o AA single MSM, domiciled alone, with PMHx HIV (sexually contracted, dx 2007, last CD4 360s, VLUD as of May 2019 per patient, on Biktarvy), unclear prior psychiatric diagnosis (possible anxiety, possible Bipolar disorder), ongoing IV crystal meth use (last use two weeks ago), presenting to [facility] ED with complaints of R foot pain and swelling for the last 8 days.    Pt presents with fast, difficult to interrupt speech but calmer mood than before and on risperidone 2mg BID. Differential includes hypomanic episode vs anxiety. Pt is endorsing questionable PI ideation about his housing. However we will need to corroborate this with outpatient SW. There is no evidence of agitation or AVH/HI/SI. Pt demonstrates adequate understanding of his current medical issues, cooperative with care. On current assessment, pt reports continued improved mood, sleep, and clarity of thinking with increased risperidone. Pt went to court on 9/6/19 to contest his involuntary admission and a discharge within 7 days was ordered. Repeat CD4/CBC/CMP ordered for 9/9/19.    Plan:  -Continue Risperidone 2mg PO BID for bipolar disorder  -Continue Acetaminophen 650mg Q6hrs PO PRN for pain  -Continue Hydroxyzine 25mg BID PO PRN for anxiety   -Continue Lorazepam 2mg QID PO PRN for agitation   -Continue Nicotine gum 2mg Q2hrs PO PRN for smoking urge  -Continue Trazodone 50mg QHS PO PRN for insomnia  -Care team will continue to psychoeducate the patient  -Per court, the pt will be discharged within 7 days of 9/6/19  -f/u CD4, CBC, CMP

## 2019-09-09 NOTE — PROGRESS NOTE BEHAVIORAL HEALTH - PROBLEM SELECTOR PLAN 1
Bactrim, see interval and summary data    -Continue Bactrim 800mg 1 table BID x 2 weeks, started 9/6 and will stop: 9/20

## 2019-09-10 PROCEDURE — 99232 SBSQ HOSP IP/OBS MODERATE 35: CPT

## 2019-09-10 RX ADMIN — BICTEGRAVIR SODIUM, EMTRICITABINE, AND TENOFOVIR ALAFENAMIDE FUMARATE 1 TABLET(S): 30; 120; 15 TABLET ORAL at 10:50

## 2019-09-10 RX ADMIN — Medication 2 MILLIGRAM(S): at 10:52

## 2019-09-10 RX ADMIN — Medication 2 MILLIGRAM(S): at 21:59

## 2019-09-10 RX ADMIN — Medication 1 TABLET(S): at 10:49

## 2019-09-10 RX ADMIN — Medication 1 TABLET(S): at 21:58

## 2019-09-10 RX ADMIN — RISPERIDONE 2 MILLIGRAM(S): 4 TABLET ORAL at 21:58

## 2019-09-10 RX ADMIN — RISPERIDONE 2 MILLIGRAM(S): 4 TABLET ORAL at 10:50

## 2019-09-10 RX ADMIN — Medication 50 MILLIGRAM(S): at 21:59

## 2019-09-10 RX ADMIN — CHLORHEXIDINE GLUCONATE 1 APPLICATION(S): 213 SOLUTION TOPICAL at 11:06

## 2019-09-10 NOTE — PROGRESS NOTE BEHAVIORAL HEALTH - SUMMARY
Pt is a 32 y/o AA single MSM, domiciled alone, with PMHx HIV (sexually contracted, dx 2007, last CD4 360s, VLUD as of May 2019 per patient, on Biktarvy), unclear prior psychiatric diagnosis (possible anxiety, possible Bipolar disorder), ongoing IV crystal meth use (last use two weeks ago), presenting to [facility] ED with complaints of R foot pain and swelling for 8 days prior.     Pt presents with fast, difficult to interrupt speech but calmer mood than before and on risperidone 2mg BID. Differential includes hypomanic episode vs anxiety. Pt is endorsing questionable PI ideation about his housing. However we will need to corroborate this with outpatient SW. There is no evidence of agitation or AVH/HI/SI. Pt demonstrates adequate understanding of his current medical issues, cooperative with care. On current assessment, pt reports continued improved mood, sleep, and clarity of thinking with risperidone. Pt went to court on 9/6/19 to contest his involuntary admission and a discharge within 7 days was ordered. Repeat CD4/CBC/CMP ordered for 9/9/19.    Plan:  -Continue Risperidone 2mg PO BID for bipolar disorder  -Continue Acetaminophen 650mg Q6hrs PO PRN for pain  -Continue Hydroxyzine 25mg BID PO PRN for anxiety   -Continue Lorazepam 2mg QID PO PRN for agitation   -Continue Nicotine gum 2mg Q2hrs PO PRN for smoking urge  -Continue Trazodone 50mg QHS PO PRN for insomnia  -Care team will continue to psychoeducate the patient  -Per court, the pt will be discharged within 7 days of 9/6/19  -f/u CD4, CBC, CMP Pt is a 32 y/o AA single MSM, domiciled alone, with PMHx HIV (sexually contracted, dx 2007, last CD4 360s, VLUD as of May 2019 per patient, on Biktarvy), unclear prior psychiatric diagnosis (possible anxiety, possible Bipolar disorder), ongoing IV crystal meth use (last use two weeks ago), presenting to [facility] ED with complaints of R foot pain and swelling for 8 days prior.     Pt presents with fast, difficult to interrupt speech but calmer mood than before and on risperidone 2mg BID. Differential includes hypomanic episode vs anxiety. Pt is endorsing questionable PI ideation about his housing. However we will need to corroborate this with outpatient SW. There is no evidence of agitation or AVH/HI/SI. Pt demonstrates adequate understanding of his current medical issues, cooperative with care. On current assessment, pt reports continued improved mood, sleep, and clarity of thinking with risperidone. Pt went to court on 9/6/19 to contest his involuntary admission and a discharge within 7 days was ordered. Repeat CD4/CBC/CMP ordered for 9/10/19.    Plan:  -Continue Risperidone 2mg PO BID for bipolar disorder  -Continue Acetaminophen 650mg Q6hrs PO PRN for pain  -Continue Hydroxyzine 25mg BID PO PRN for anxiety   -Continue Lorazepam 2mg QID PO PRN for agitation   -Continue Nicotine gum 2mg Q2hrs PO PRN for smoking urge  -Continue Trazodone 50mg QHS PO PRN for insomnia  -Care team will continue to psychoeducate the patient  -Per court, the pt will be discharged within 7 days of 9/6/19  -f/u CD4 count

## 2019-09-10 NOTE — PROGRESS NOTE BEHAVIORAL HEALTH - NSBHCHARTREVIEWLAB_PSY_A_CORE FT
CBC Full  -  ( 10 Sep 2019 14:44 )  WBC Count : 6.37 K/uL  RBC Count : 4.27 M/uL  Hemoglobin : 12.8 g/dL  Hematocrit : 39.9 %  Platelet Count - Automated : 395 K/uL  Mean Cell Volume : 93.4 fl  Mean Cell Hemoglobin : 30.0 pg  Mean Cell Hemoglobin Concentration : 32.1 gm/dL  09-10    138  |  103  |  15  ----------------------------<  91  4.9   |  26  |  1.20    Ca    9.4      10 Sep 2019 14:44    TPro  7.2  /  Alb  4.0  /  TBili  0.2  /  DBili  x   /  AST  22  /  ALT  22  /  AlkPhos  66  09-10

## 2019-09-10 NOTE — PROGRESS NOTE BEHAVIORAL HEALTH - NSBHFUPINTERVALHXFT_PSY_A_CORE
The patient reported "I feel the usual, no better". He notes good sleep and that his right foot was a lot better and continuing to heal. Patient notes that the environment and people are better and is considering working out tonight. Denies AH/VH/SI/HI.     Per staff, the patient was visible in intervals, makes his needs known, less intrusive, and had his behavior in checked. He was noted to be discharge focused and had poor frustration tolerance. Otherwise, the patient had his dressing change on his right foot with good healing and was compliant with medication and from his PRN medications took nicotine gum. The patient reported "I feel the usual, no better". He notes good sleep and that his right foot was a lot better and continuing to heal. Patient notes that the environment and people are better and is considering working out tonight. Denies AH/VH/SI/HI.     Per staff, the patient was visible in intervals, makes his needs known, less intrusive, and had his behavior in check. He was also noted to be discharge focused and had poor frustration tolerance. Otherwise, the patient had his dressing change on his right foot with good healing and was compliant with medication and took PRN nicotine gum last night.

## 2019-09-10 NOTE — PROGRESS NOTE BEHAVIORAL HEALTH - NSBHCHARTREVIEWVS_PSY_A_CORE FT
Vital Signs Last 24 Hrs  T(C): 36.8 (10 Sep 2019 06:07), Max: 36.8 (09 Sep 2019 20:54)  T(F): 98.2 (10 Sep 2019 06:07), Max: 98.3 (09 Sep 2019 20:54)  HR: 86 (10 Sep 2019 06:07) (85 - 98)  BP: 107/66 (10 Sep 2019 06:07) (107/66 - 130/79)  BP(mean): --  RR: 18 (10 Sep 2019 06:07) (18 - 18)  SpO2: 97% (10 Sep 2019 06:07) (97% - 98%) Vital Signs Last 24 Hrs  T(C): 36.8 (10 Sep 2019 08:45), Max: 36.8 (09 Sep 2019 20:54)  T(F): 98.3 (10 Sep 2019 08:45), Max: 98.3 (09 Sep 2019 20:54)  HR: 89 (10 Sep 2019 08:45) (85 - 98)  BP: 119/74 (10 Sep 2019 08:45) (107/66 - 130/79)  BP(mean): --  RR: 20 (10 Sep 2019 08:45) (18 - 20)  SpO2: 99% (10 Sep 2019 08:45) (97% - 99%)

## 2019-09-11 VITALS
HEART RATE: 95 BPM | SYSTOLIC BLOOD PRESSURE: 111 MMHG | OXYGEN SATURATION: 100 % | DIASTOLIC BLOOD PRESSURE: 66 MMHG | RESPIRATION RATE: 20 BRPM

## 2019-09-11 PROBLEM — Z00.00 ENCOUNTER FOR PREVENTIVE HEALTH EXAMINATION: Status: ACTIVE | Noted: 2019-09-11

## 2019-09-11 PROCEDURE — 86359 T CELLS TOTAL COUNT: CPT

## 2019-09-11 PROCEDURE — 80061 LIPID PANEL: CPT

## 2019-09-11 PROCEDURE — 36415 COLL VENOUS BLD VENIPUNCTURE: CPT

## 2019-09-11 PROCEDURE — 85027 COMPLETE CBC AUTOMATED: CPT

## 2019-09-11 PROCEDURE — 99238 HOSP IP/OBS DSCHRG MGMT 30/<: CPT

## 2019-09-11 PROCEDURE — 84443 ASSAY THYROID STIM HORMONE: CPT

## 2019-09-11 PROCEDURE — 80053 COMPREHEN METABOLIC PANEL: CPT

## 2019-09-11 PROCEDURE — 83036 HEMOGLOBIN GLYCOSYLATED A1C: CPT

## 2019-09-11 RX ORDER — RISPERIDONE 4 MG/1
1 TABLET ORAL
Qty: 30 | Refills: 0
Start: 2019-09-11 | End: 2019-09-25

## 2019-09-11 RX ORDER — CHLORHEXIDINE GLUCONATE 213 G/1000ML
1 SOLUTION TOPICAL
Qty: 1 | Refills: 0
Start: 2019-09-11 | End: 2019-09-25

## 2019-09-11 RX ORDER — BICTEGRAVIR SODIUM, EMTRICITABINE, AND TENOFOVIR ALAFENAMIDE FUMARATE 30; 120; 15 MG/1; MG/1; MG/1
1 TABLET ORAL
Qty: 15 | Refills: 0
Start: 2019-09-11 | End: 2019-09-25

## 2019-09-11 RX ORDER — BICTEGRAVIR SODIUM, EMTRICITABINE, AND TENOFOVIR ALAFENAMIDE FUMARATE 30; 120; 15 MG/1; MG/1; MG/1
1 TABLET ORAL
Qty: 0 | Refills: 0 | DISCHARGE

## 2019-09-11 RX ADMIN — BICTEGRAVIR SODIUM, EMTRICITABINE, AND TENOFOVIR ALAFENAMIDE FUMARATE 1 TABLET(S): 30; 120; 15 TABLET ORAL at 09:53

## 2019-09-11 RX ADMIN — Medication 2 MILLIGRAM(S): at 09:55

## 2019-09-11 RX ADMIN — Medication 1 TABLET(S): at 09:53

## 2019-09-11 RX ADMIN — RISPERIDONE 2 MILLIGRAM(S): 4 TABLET ORAL at 09:53

## 2019-09-11 RX ADMIN — CHLORHEXIDINE GLUCONATE 1 APPLICATION(S): 213 SOLUTION TOPICAL at 13:47

## 2019-09-11 NOTE — PROGRESS NOTE BEHAVIORAL HEALTH - PROBLEM SELECTOR PROBLEM 3
Cellulitis and abscess of right leg
Syphilis, unspecified

## 2019-09-11 NOTE — PROGRESS NOTE BEHAVIORAL HEALTH - NSBHCHARTREVIEWVS_PSY_A_CORE FT
Vital Signs Last 24 Hrs  T(C): 36.5 (11 Sep 2019 06:27), Max: 37 (10 Sep 2019 20:47)  T(F): 97.7 (11 Sep 2019 06:27), Max: 98.6 (10 Sep 2019 20:47)  HR: 89 (11 Sep 2019 06:27) (89 - 97)  BP: 120/72 (11 Sep 2019 06:27) (120/72 - 130/70)  BP(mean): --  RR: 18 (10 Sep 2019 20:47) (18 - 18)  SpO2: 98% (11 Sep 2019 06:27) (98% - 99%) Vital Signs Last 24 Hrs  T(C): 36.5 (11 Sep 2019 06:27), Max: 37 (10 Sep 2019 20:47)  T(F): 97.7 (11 Sep 2019 06:27), Max: 98.6 (10 Sep 2019 20:47)  HR: 95 (11 Sep 2019 09:03) (89 - 97)  BP: 111/66 (11 Sep 2019 09:03) (111/66 - 130/70)  BP(mean): --  RR: 20 (11 Sep 2019 09:03) (18 - 20)  SpO2: 100% (11 Sep 2019 09:03) (98% - 100%)

## 2019-09-11 NOTE — PROGRESS NOTE BEHAVIORAL HEALTH - PRN MEDS
Nicotine Gum 2mg PO for smoking urge on 9/10 at 10:52am and 9:59pm.  Trazadone 50mg PO for insomnia on 9/10 at 9:59pm
Acetaminophen 650mg PO for pain on 9/7 at 10:14pm  Nicotine Gum 2mg PO for smoking urge on 9/6 at 9:41pm, 9/7 at 9:57am and 10:16pm, 9/8 at 3:53pm and 10:08pm, and 9/9 at 7:17am and  11:25am  Trazadone 50mg PO for insomnia on 9/6 at 9:41pm and 9/7 at 10:12pm
Nicotine Gum 2mg PO for smoking urge on 9/9 at 11:25am, 6:01pm, and 10:14pm.
Acetaminophen 650mg PO for pain on 9/5/19 at 3:48pm and 10:19pm  Nicotine Gum 2mg PO for smoking urge on 9/5/19 at 3:49pm and 10:14pm   Trazadone 50mg PO for insomnia on 9/5/19 at 10:11pm
Trazadone 50mg PO for insomnia at 10:12pm on 9/4/19  Nicotine 2mg PO for smoking urge at 6:50pm and 10:12pm on 9/4/19  Acetaminophen 650mg PO for pain at 6:50am on 9/5/19

## 2019-09-11 NOTE — PROGRESS NOTE BEHAVIORAL HEALTH - NSBHFUPINTERVALCCFT_PSY_A_CORE
Sleeping, refused interview
"I feel calmer"
"I was never diagnosed with bipolar but it makes more sense now"
"I feel the usual"
"My mood feels good"
"I feel calmer"
"This place makes me depressed"
I better leave by friday
feels he shouldn't be here; blames a verbal dispute

## 2019-09-11 NOTE — PROGRESS NOTE BEHAVIORAL HEALTH - PROBLEM SELECTOR PROBLEM 2
Asymptomatic HIV infection

## 2019-09-11 NOTE — PROGRESS NOTE BEHAVIORAL HEALTH - PROBLEM SELECTOR PLAN 1
Bactrim, see interval and summary data    -Continue Bactrim 800mg 1 table BID x 2 weeks, started 9/6 and will stop: 9/20 Bactrim, see interval and summary data  -Continue Bactrim 800mg 1 table BID x 2 weeks, started 9/6 and will stop: 9/20

## 2019-09-11 NOTE — PROGRESS NOTE BEHAVIORAL HEALTH - NS ED BHA MED ROS NEUROLOGICAL
No complaints
Unable to assess
No complaints

## 2019-09-11 NOTE — PROGRESS NOTE BEHAVIORAL HEALTH - SUMMARY
Pt is a 32 y/o AA single MSM, domiciled alone, with PMHx HIV (sexually contracted, dx 2007, last CD4 360s, VLUD as of May 2019 per patient, on Biktarvy), unclear prior psychiatric diagnosis (possible anxiety, possible Bipolar disorder), ongoing IV crystal meth use (last use two weeks ago), presenting to [facility] ED with complaints of R foot pain and swelling for 8 days prior.     Pt presents with fast, difficult to interrupt speech but calmer mood than before and on risperidone 2mg BID. Differential includes hypomanic episode vs anxiety. Pt is endorsing questionable PI ideation about his housing. However we will need to corroborate this with outpatient SW. There is no evidence of agitation or AVH/HI/SI. Pt demonstrates adequate understanding of his current medical issues, cooperative with care. On current assessment, pt reports continued improved mood, sleep, and clarity of thinking with risperidone. Pt went to court on 9/6/19 to contest his involuntary admission and a discharge within 7 days was ordered. Repeat CD4/CBC/CMP ordered for 9/10/19. Discharged planned for 9/11/19, pt is future oriented with plans after discharge and has constructed a safety plan with his .     Plan:  -Continue Risperidone 2mg PO BID for bipolar disorder  -Continue Acetaminophen 650mg Q6hrs PO PRN for pain  -Continue Hydroxyzine 25mg BID PO PRN for anxiety   -Continue Lorazepam 2mg QID PO PRN for agitation   -Continue Nicotine gum 2mg Q2hrs PO PRN for smoking urge  -Continue Trazodone 50mg QHS PO PRN for insomnia  -Care team will continue to psychoeducate the patient  -Per court, the pt will be discharged within 7 days of 9/6/19  -Plan for discharge on 9/11/19  -f/u CD4 count Pt is a 32 y/o AA single MSM, domiciled alone, with PMHx HIV (sexually contracted, dx 2007, last CD4 360s, VLUD as of May 2019 per patient, on Biktarvy), unclear prior psychiatric diagnosis (possible anxiety, possible Bipolar disorder), ongoing IV crystal meth use (last use two weeks ago), presenting to [facility] ED with complaints of R foot pain and swelling for 8 days prior.     Pt presents with fast, difficult to interrupt speech but calmer mood than before and on risperidone 2mg BID. Differential includes hypomanic episode vs anxiety. Pt is endorsing questionable PI ideation about his housing. However we will need to corroborate this with outpatient SW. There is no evidence of agitation or AVH/HI/SI. Pt demonstrates adequate understanding of his current medical issues, cooperative with care. On current assessment, pt reports continued improved mood, sleep, and clarity of thinking with risperidone. Pt went to court on 9/6/19 to contest his involuntary admission and a discharge within 7 days was ordered. Repeat CD4/CBC/CMP ordered for 9/10/19. Discharged planned for 9/11/19, pt is future oriented with plans after discharge and has constructed a safety plan with his .     Plan:  -Continue Risperidone 2mg PO BID for bipolar disorder  -Continue Bactrim, Biktarvy, and Penicillin for infectious comorbidities  -Plan for discharge on 9/11/19  -f/u as outpatient with psychiatry and primary care

## 2019-09-11 NOTE — PROGRESS NOTE BEHAVIORAL HEALTH - NSBHATTESTSEENBY_PSY_A_CORE
Trainee with telephonic supervision from Attending Psychiatrist
Trainee with telephonic supervision from Attending Psychiatrist
Attending Psychiatrist supervising NP/Trainee, meeting pt...

## 2019-09-11 NOTE — PROGRESS NOTE BEHAVIORAL HEALTH - NSBHADMITIPOBSFT_PSY_A_CORE
Able to make needs known, but remains psychotic
Able to make needs known, but remains psychotic
makes needs known

## 2019-09-11 NOTE — PROGRESS NOTE BEHAVIORAL HEALTH - NSBHADMITIPREASONDETAILS_PSY_A_CORE FT
Pt's presenting symptoms have resolved but he requires further stabilization on psychiatric medications as well as management of comorbid conditions (e.g. cellulitis)

## 2019-09-11 NOTE — PROGRESS NOTE BEHAVIORAL HEALTH - NSBHCHARTREVIEWLAB_PSY_A_CORE FT
CBC Full  -  ( 10 Sep 2019 14:44 )  WBC Count : 6.37 K/uL  RBC Count : 4.27 M/uL  Hemoglobin : 12.8 g/dL  Hematocrit : 39.9 %  Platelet Count - Automated : 395 K/uL  Mean Cell Volume : 93.4 fl  Mean Cell Hemoglobin : 30.0 pg  Mean Cell Hemoglobin Concentration : 32.1 gm/dL  09-10    138  |  103  |  15  ----------------------------<  91  4.9   |  26  |  1.20    Ca    9.4      10 Sep 2019 14:44    TPro  7.2  /  Alb  4.0  /  TBili  0.2  /  DBili  x   /  AST  22  /  ALT  22  /  AlkPhos  66  09-10 CD4 = 394 on 9/11/19    CBC Full  -  ( 10 Sep 2019 14:44 )  WBC Count : 6.37 K/uL  RBC Count : 4.27 M/uL  Hemoglobin : 12.8 g/dL  Hematocrit : 39.9 %  Platelet Count - Automated : 395 K/uL  Mean Cell Volume : 93.4 fl  Mean Cell Hemoglobin : 30.0 pg  Mean Cell Hemoglobin Concentration : 32.1 gm/dL  09-10    138  |  103  |  15  ----------------------------<  91  4.9   |  26  |  1.20    Ca    9.4      10 Sep 2019 14:44    TPro  7.2  /  Alb  4.0  /  TBili  0.2  /  DBili  x   /  AST  22  /  ALT  22  /  AlkPhos  66  09-10

## 2019-09-11 NOTE — PROGRESS NOTE BEHAVIORAL HEALTH - NSBHFUPTYPE_PSY_A_CORE
Inpatient
Inpatient-On Service Note
Inpatient

## 2019-09-11 NOTE — PROGRESS NOTE BEHAVIORAL HEALTH - PROBLEM SELECTOR PLAN 2
antivirals
BIKTARVY see interval and summary data
BIKTARVY, see interval and summary data
BIKTARVY, see interval and summary data
BIKTARVY see interval and summary data
BIKTARVY, see interval and summary data
BIKTARVY, see interval and summary data

## 2019-09-11 NOTE — PROGRESS NOTE BEHAVIORAL HEALTH - CASE SUMMARY
Patient's Chief Complaint: I don't want to be here ; HPI (include illness quality, severity, duration, timing, context, modifying factors, associated signs and symptoms): 33 Y AA male, with unknown psychiatric hx, recent crystal meth use, discharged from [facility] inpatient unit one week ago, admitted to medicine for a foot abscess and cellulitis, found to test positive for syphilis, under care of ID, transferred to psychiatry unit for shaq. Presently, patient continues to be pressured, exhibit flight of ideas, irritability, and disorganized thought process. He denies suicidal thoughts but does not cooperate with the rest of the interview, stating "if you don't discharge me you arent really here to help me". He does mention that he got injured by running away from home, "from people who want to hurt me" and that he was in [facility] inpatient psych unit one week ago.  He was compliant with all meds this morning and compliant with podiatry assessment. Per staff, patient was highly agitated last night, threatening to spit on people, but has been in behavioral control today.  ;;9/3: alert; oriented; speech clear; focused on not wanting to be in hospital and minimzing physical issues despite acknowledging L food infection.  despite report above he is also denying substance abuse issues and blames a verbal dispute resulting in his admission with a dismissive approach to sxs (Patent has MRSA infection of foot which can be poorly responsive to treatment)  informed by ID that wound must be covered; patient is in room alone.   ...zed thought process. He denies suicidal thoughts but does not cooperate with the rest of the interview, stating "if you don't discharge me you arent really here to help me". He does mention that he got injured by running away from home, "from people who want to hurt me" and that he was in [facility] inpatient psych unit one week ago.  He was compliant with all meds this morning and compliant with podiatry assessment. Per staff, patient was highly agitated last night, threatening to spit on people, but has been in behavioral control today.  ;;9/3: alert; oriented; speech clear; focused on not wanting to be in hospital and minimzing physical issues despite acknowledging L food infection.  despite report above he is also denying substance abuse issues and blames a verbal dispute resulting in his admission with a dismissive approach to sxs (Patent has MRSA infection of foot which can be poorly responsive to treatment)  informed by ID that wound must be covered; patient is in room alone.
Patient's Chief Complaint: I don't want to be here ; HPI (include illness quality, severity, duration, timing, context, modifying factors, associated signs and symptoms): 33 Y AA male, with unknown psychiatric hx, recent crystal meth use, discharged from [facility] inpatient unit one week ago, admitted to medicine for a foot abscess and cellulitis, found to test positive for syphilis, under care of ID, transferred to psychiatry unit for shaq. Presently, patient continues to be pressured, exhibit flight of ideas, irritability, and disorganized thought process. He denies suicidal thoughts but does not cooperate with the rest of the interview, stating "if you don't discharge me you arent really here to help me". He does mention that he got injured by running away from home, "from people who want to hurt me" and that he was in [facility] inpatient psych unit one week ago.  He was compliant with all meds this morning and compliant with podiatry assessment. Per staff, patient was highly agitated last night, threatening to spit on people, but has been in behavioral control today.  ;;9/3: alert; oriented; speech clear; focused on not wanting to be in hospital and minimzing physical issues despite acknowledging L food infection.  despite report above he is also denying substance abuse issues and blames a verbal dispute resulting in his admission with a dismissive approach to sxs (Patent has MRSA infection of foot which can be poorly responsive to treatment)  informed by ID that wound must be covered; patient is in room alone.  ;;9/4: contnues irritable and dismissive; worries about people tracking dirt into his room;  compliant with meds; questions RIsperdal dosing. ;;9/5:  "I have thinks to take care of"; worries about foot infection;.  less labile or irrtiable than yesterday but just as d/c focused; superificially denies s/h i/i/p or avh; staff reports patient is a little less irritable;.  continue BICTARVY Clorhexidine cloths... on people, but has been in behavioral control today.  ;;9/3: alert; oriented; speech clear; focused on not wanting to be in hospital and minimzing physical issues despite acknowledging L food infection.  despite report above he is also denying substance abuse issues and blames a verbal dispute resulting in his admission with a dismissive approach to sxs (Patent has MRSA infection of foot which can be poorly responsive to treatment)  informed by ID that wound must be covered; patient is in room alone.  ;;9/4: contnues irritable and dismissive; worries about people tracking dirt into his room;  compliant with meds; questions RIsperdal dosing. ;;9/5:  "I have thinks to take care of"; worries about foot infection;.  less labile or irrtiable than yesterday but just as d/c focused; superificially denies s/h i/i/p or avh; staff reports patient is a little less irritable;.  continue BICTARVY Clorhexidine cloths to MRSA R foot; Bacrtim DS; Billicin Rispserdal at 2mg po bid for mood;
Patient's Chief Complaint: I don't want to be here ; HPI (include illness quality, severity, duration, timing, context, modifying factors, associated signs and symptoms): 33 Y AA male, with unknown psychiatric hx, recent crystal meth use, discharged from [facility] inpatient unit one week ago, admitted to medicine for a foot abscess and cellulitis, found to test positive for syphilis, under care of ID, transferred to psychiatry unit for shaq. Presently, patient continues to be pressured, exhibit flight of ideas, irritability, and disorganized thought process. He denies suicidal thoughts but does not cooperate with the rest of the interview, stating "if you don't discharge me you arent really here to help me". He does mention that he got injured by running away from home, "from people who want to hurt me" and that he was in [facility] inpatient psych unit one week ago.  He was compliant with all meds this morning and compliant with podiatry assessment. Per staff, patient was highly agitated last night, threatening to spit on people, but has been in behavioral control today.  ;;9/3: alert; oriented; speech clear; focused on not wanting to be in hospital and minimzing physical issues despite acknowledging L food infection.  despite report above he is also denying substance abuse issues and blames a verbal dispute resulting in his admission with a dismissive approach to sxs (Patent has MRSA infection of foot which can be poorly responsive to treatment)  informed by ID that wound must be covered; patient is in room alone.  ;;9/4: contnues irritable and dismissive; worries about people tracking dirt into his room;  compliant with meds; questions RIsperdal dosing. ;;9/5:  "I have thinks to take care of"; worries about foot infection;.  less labile or irrtiable than yesterday but just as d/c focused; superificially denies s/h i/i/p or avh; staff reports patient is a little less irritable;.  continue BICTARVY Clorhexidine cloths...nt; went to court for Retention hearing;.  irritable; good eye contact; good adls; cooperative with treatment;  no s/h i/i/p ;.  conitnue current Risperdal and medicaitons as above and begin efforts for aftercare planning.  ;;9/9: concern about CD count and wants another test; father bought replacement cell phone and phone is being sent to the hospital ; concern about recieving phone;.  less labile; good eye contact; alert; oriented; no s/h i/i/p or avh; anxious ; no peculiarities of thinking; cog intact;.  court ordered discharge by 9/13; patient's mood continues to stabilize; begin discharge planning.  ;;9/10:; no foot complaints; focused on leaving; likely dc tomorrow in view of less irritable and stable and judicial order.  ;;9/11: continues to feel good; no foot or other complaints; anticipates leaving today;.  anxious but no s/h i/i/p or avh; optimistic; speech clear; fair eye contact; good adls; no tremor;.  patient maitaining stabiity and discharged as per court order.
Patient's Chief Complaint: I don't want to be here ; HPI (include illness quality, severity, duration, timing, context, modifying factors, associated signs and symptoms): 33 Y AA male, with unknown psychiatric hx, recent crystal meth use, discharged from [facility] inpatient unit one week ago, admitted to medicine for a foot abscess and cellulitis, found to test positive for syphilis, under care of ID, transferred to psychiatry unit for shaq. Presently, patient continues to be pressured, exhibit flight of ideas, irritability, and disorganized thought process. He denies suicidal thoughts but does not cooperate with the rest of the interview, stating "if you don't discharge me you arent really here to help me". He does mention that he got injured by running away from home, "from people who want to hurt me" and that he was in [facility] inpatient psych unit one week ago.  He was compliant with all meds this morning and compliant with podiatry assessment. Per staff, patient was highly agitated last night, threatening to spit on people, but has been in behavioral control today.  ;;9/3: alert; oriented; speech clear; focused on not wanting to be in hospital and minimzing physical issues despite acknowledging L food infection.  despite report above he is also denying substance abuse issues and blames a verbal dispute resulting in his admission with a dismissive approach to sxs (Patent has MRSA infection of foot which can be poorly responsive to treatment)  informed by ID that wound must be covered; patient is in room alone.  ;;9/4: contnues irritable and dismissive; worries about people tracking dirt into his room;  compliant with meds; questions RIsperdal dosing. ;;9/5:  "I have thinks to take care of"; worries about foot infection;.  less labile or irrtiable than yesterday but just as d/c focused; superificially denies s/h i/i/p or avh; staff reports patient is a little less irritable;.  continue BICTARVY Clorhexidine cloths...erificially denies s/h i/i/p or avh; staff reports patient is a little less irritable;.  continue BICTARVY Clorhexidine cloths to MRSA R foot; Bacrtim DS; Billicin Rispserdal at 2mg po bid for mood;  ;;9/6: willing to stay 7 days for comp[letion of treatment; went to court for Retention hearing;.  irritable; good eye contact; good adls; cooperative with treatment;  no s/h i/i/p ;.  conitnue current Risperdal and medicaitons as above and begin efforts for aftercare planning.  ;;9/9: concern about CD count and wants another test; father bought replacement cell phone and phone is being sent to the hospital ; concern about recieving phone;.  less labile; good eye contact; alert; oriented; no s/h i/i/p or avh; anxious ; no peculiarities of thinking; cog intact;.  court ordered discharge by 9/13; patient's mood continues to stabilize; begin discharge planning.  ;;9/10:; no foot complaints; focused on leaving; likely dc tomorrow in view of less irritable and stable and judicial order.
Patient's Chief Complaint: I don't want to be here ; HPI (include illness quality, severity, duration, timing, context, modifying factors, associated signs and symptoms): 33 Y AA male, with unknown psychiatric hx, recent crystal meth use, discharged from [facility] inpatient unit one week ago, admitted to medicine for a foot abscess and cellulitis, found to test positive for syphilis, under care of ID, transferred to psychiatry unit for shaq. Presently, patient continues to be pressured, exhibit flight of ideas, irritability, and disorganized thought process. He denies suicidal thoughts but does not cooperate with the rest of the interview, stating "if you don't discharge me you arent really here to help me". He does mention that he got injured by running away from home, "from people who want to hurt me" and that he was in [facility] inpatient psych unit one week ago.  He was compliant with all meds this morning and compliant with podiatry assessment. Per staff, patient was highly agitated last night, threatening to spit on people, but has been in behavioral control today.  ;;9/3: alert; oriented; speech clear; focused on not wanting to be in hospital and minimzing physical issues despite acknowledging L food infection.  despite report above he is also denying substance abuse issues and blames a verbal dispute resulting in his admission with a dismissive approach to sxs (Patent has MRSA infection of foot which can be poorly responsive to treatment)  informed by ID that wound must be covered; patient is in room alone.  ;;9/4: contnues irritable and dismissive; worries about people tracking dirt into his room;  compliant with meds; questions RIsperdal dosing. ;;9/5:  "I have thinks to take care of"; worries about foot infection;.  less labile or irrtiable than yesterday but just as d/c focused; superificially denies s/h i/i/p or avh; staff reports patient is a little less irritable;.  continue BICTARVY Clorhexidine cloths...e thinks to take care of"; worries about foot infection;.  less labile or irrtiable than yesterday but just as d/c focused; superificially denies s/h i/i/p or avh; staff reports patient is a little less irritable;.  continue BICTARVY Clorhexidine cloths to MRSA R foot; Bacrtim DS; Billicin Rispserdal at 2mg po bid for mood;  ;;9/6: willing to stay 7 days for comp[letion of treatment; went to court for Retention hearing;.  irritable; good eye contact; good adls; cooperative with treatment;  no s/h i/i/p ;.  conitnue current Risperdal and medicaitons as above and begin efforts for aftercare planning.  ;;9/9: concern about CD count and wants another test; father bought replacement cell phone and phone is being sent to the hospital ; concern about recieving phone;.  less labile; good eye contact; alert; oriented; no s/h i/i/p or avh; anxious ; no peculiarities of thinking; cog intact;.  court ordered discharge by 9/13; patient's mood continues to stabilize; begin discharge planning.
Patient's Chief Complaint: I don't want to be here ; HPI (include illness quality, severity, duration, timing, context, modifying factors, associated signs and symptoms): 33 Y AA male, with unknown psychiatric hx, recent crystal meth use, discharged from [facility] inpatient unit one week ago, admitted to medicine for a foot abscess and cellulitis, found to test positive for syphilis, under care of ID, transferred to psychiatry unit for shaq. Presently, patient continues to be pressured, exhibit flight of ideas, irritability, and disorganized thought process. He denies suicidal thoughts but does not cooperate with the rest of the interview, stating "if you don't discharge me you arent really here to help me". He does mention that he got injured by running away from home, "from people who want to hurt me" and that he was in [facility] inpatient psych unit one week ago.  He was compliant with all meds this morning and compliant with podiatry assessment. Per staff, patient was highly agitated last night, threatening to spit on people, but has been in behavioral control today.  ;;9/3: alert; oriented; speech clear; focused on not wanting to be in hospital and minimzing physical issues despite acknowledging L food infection.  despite report above he is also denying substance abuse issues and blames a verbal dispute resulting in his admission with a dismissive approach to sxs (Patent has MRSA infection of foot which can be poorly responsive to treatment)  informed by ID that wound must be covered; patient is in room alone.  ;;9/4: contnues irritable and dismissive; worries about people tracking dirt into his room;  compliant with meds; questions RIsperdal dosing. ;;9/5:  "I have thinks to take care of"; worries about foot infection;.  less labile or irrtiable than yesterday but just as d/c focused; superificially denies s/h i/i/p or avh; staff reports patient is a little less irritable;.  continue BICTARVY Clorhexidine cloths...s a verbal dispute resulting in his admission with a dismissive approach to sxs (Patent has MRSA infection of foot which can be poorly responsive to treatment)  informed by ID that wound must be covered; patient is in room alone.  ;;9/4: contnues irritable and dismissive; worries about people tracking dirt into his room;  compliant with meds; questions RIsperdal dosing. ;;9/5:  "I have thinks to take care of"; worries about foot infection;.  less labile or irrtiable than yesterday but just as d/c focused; superificially denies s/h i/i/p or avh; staff reports patient is a little less irritable;.  continue BICTARVY Clorhexidine cloths to MRSA R foot; Bacrtim DS; Billicin Rispserdal at 2mg po bid for mood;  ;;9/6: willing to stay 7 days for comp[letion of treatment; went to court for Retention hearing;.  irritable; good eye contact; good adls; cooperative with treatment;  no s/h i/i/p ;.  conitnue current Risperdal and medicaitons as above and begin efforts for aftercare planning.
Patient's Chief Complaint: I don't want to be here ; HPI (include illness quality, severity, duration, timing, context, modifying factors, associated signs and symptoms): 33 Y AA male, with unknown psychiatric hx, recent crystal meth use, discharged from [facility] inpatient unit one week ago, admitted to medicine for a foot abscess and cellulitis, found to test positive for syphilis, under care of ID, transferred to psychiatry unit for shaq. Presently, patient continues to be pressured, exhibit flight of ideas, irritability, and disorganized thought process. He denies suicidal thoughts but does not cooperate with the rest of the interview, stating "if you don't discharge me you arent really here to help me". He does mention that he got injured by running away from home, "from people who want to hurt me" and that he was in [facility] inpatient psych unit one week ago.  He was compliant with all meds this morning and compliant with podiatry assessment. Per staff, patient was highly agitated last night, threatening to spit on people, but has been in behavioral control today.  ;;9/3: alert; oriented; speech clear; focused on not wanting to be in hospital and minimzing physical issues despite acknowledging L food infection.  despite report above he is also denying substance abuse issues and blames a verbal dispute resulting in his admission with a dismissive approach to sxs (Patent has MRSA infection of foot which can be poorly responsive to treatment)  informed by ID that wound must be covered; patient is in room alone.  ;;9/4: who;e refusing PO meds would allow SUSTENNA IM; continues disorganized with poor adls internally preocucpied; will give SUSTENNA 156mg IM once and observe.     ... me". He does mention that he got injured by running away from home, "from people who want to hurt me" and that he was in [facility] inpatient psych unit one week ago.  He was compliant with all meds this morning and compliant with podiatry assessment. Per staff, patient was highly agitated last night, threatening to spit on people, but has been in behavioral control today.  ;;9/3: alert; oriented; speech clear; focused on not wanting to be in hospital and minimzing physical issues despite acknowledging L food infection.  despite report above he is also denying substance abuse issues and blames a verbal dispute resulting in his admission with a dismissive approach to sxs (Patent has MRSA infection of foot which can be poorly responsive to treatment)  informed by ID that wound must be covered; patient is in room alone.  ;;9/4: who;e refusing PO meds would allow SUSTENNA IM; continues disorganized with poor adls internally preocucpied; will give SUSTENNA 156mg IM once and observe.

## 2019-09-11 NOTE — PROGRESS NOTE BEHAVIORAL HEALTH - NSBHPTASSESSDT_PSY_A_CORE
01-Sep-2019 15:40
04-Sep-2019 08:23
09-Sep-2019 08:53
05-Sep-2019 08:15
11-Sep-2019 08:44
06-Sep-2019 08:01
10-Sep-2019 08:18
02-Sep-2019 15:15
03-Sep-2019 09:36

## 2019-09-11 NOTE — PROGRESS NOTE BEHAVIORAL HEALTH - NSBHFUPINTERVALHXFT_PSY_A_CORE
The patient reports that on medication "I feel calmer, the medications seem to be really good for me, and I think more before getting angry". He notes slight difficultly falling asleep due to being discharge focused and planning, but was able to achieve 8 hours of sleep with the help of PRN medications. When asked about plans for after discharge, the pt reports planning to go home, fix up his house, and going to a meeting with friends. Denies AH/VH/SI/HI and safety plan was made with his .     Per staff, the patient was observed to be less unstable, social with select peers, behavior controlled, medication compliant, and made needs known. He was discharge focused and perseverative over cleaning self and area. Patient also endorsed improved mood. The patient reports that on medication "I feel calmer, the medications seem to be really good for me, and I think more before getting angry". He was able to achieve 8 hours of sleep with the help of PRN medications. When asked about plans for after discharge, the pt reports planning to go home, fix up his house, and going to a meeting with friends.   Per staff, the patient was observed to be less unstable, social with select peers, behavior controlled, medication compliant, and made needs known; he was discharge focused and perseverative over cleaning self and area.   Safety plan was made with his . At the time of discharge, pt's affect was stable, his paranoia resolved, he exhibited good insight/judgement without symptoms of active shaq. He was future oriented, had no homicidal/suicidal ideation/intent/plan or AVH and was deemed safe for discharge to home with close follow up with a psychiatrist as well as primary care doctor (for management of infections). Pt will receive final penicillin injection (Penicillin G, 2.4 million units IM, due 9/13/19) from his PCP and he has been informed of this. The patient reports that on medication "I feel calmer, the medications seem to be really good for me, and I think more before getting angry". He was able to achieve 8 hours of sleep with the help of PRN medications. When asked about plans for after discharge, the pt reports planning to go home, fix up his house, and going to a meeting with friends.   Per staff, the patient was observed to be less unstable, social with select peers, behavior controlled, medication compliant, and made needs known; he was discharge focused and perseverative over cleaning self and area.   Safety plan was made with his . At the time of discharge, pt's affect was stable, his paranoia resolved, he exhibited good insight/judgement without symptoms of active shaq. He was future oriented, had no homicidal/suicidal ideation/intent/plan or AVH and was deemed safe for discharge to home with close follow up with a psychiatrist as well as primary care doctor (for management of infections). Pt will receive final penicillin injection (Penicillin G, 2.4 million units IM) from his new PCP office on 9/12/19 and he has been informed of this (see discharge summary for details).

## 2019-09-11 NOTE — PROGRESS NOTE BEHAVIORAL HEALTH - NSBHADMITIPOBS_PSY_A_CORE
Enhanced supervision
Enhanced supervision
Routine observation

## 2019-09-11 NOTE — PROGRESS NOTE BEHAVIORAL HEALTH - RISK ASSESSMENT
---xxx  Risk elements: history of schizoaffective illness; possible manic history; history of bipolar disorder; not working or having regular daily routine; appears able to comprehend situation; recent inpatient admission(s); -mother;   At time of admission suicide risk was HIGH.    Static: chronic psychiatric disorder; mood issues; mood episodes; may be under financial stress; chronic psychiatric issues;     Modifiable: treatment of psychotic sxs; treat underlying mood issues; help focus on personal goals and structure daily activities; assess/address treatment compliance and efficacy issues; assist engaging possible support network;     Protective: cognitively able to engage in treatment; may have involved family;   Modifiable factors addressed in treatment plan; see summary and interval data for updates    Estimated length of stay based on admission data is 19 days. Estimated discharge date is/was: 09/18/19. ---xxx  Risk elements: history of schizoaffective illness; possible manic history; history of bipolar disorder; not working or having regular daily routine; appears able to comprehend situation; recent inpatient admission(s); -mother;     Static: chronic psychiatric disorder; mood issues; mood episodes; may be under financial stress; chronic psychiatric issues;     Modifiable: treatment of psychotic sxs; treat underlying mood issues; help focus on personal goals and structure daily activities; assess/address treatment compliance and efficacy issues; assist engaging possible support network;     Protective: cognitively able to engage in treatment; may have involved family;   Modifiable factors addressed in treatment plan; see summary and interval data for updates    At time of admission his risk of harm to himself/others was high, however by the time of discharge his presenting symptoms had resolved, and modifiable risk factors had been addressed, so pt's risk of harm to self/others was LOW.    Estimated length of stay based on admission data is 19 days. Estimated discharge date is/was: 09/18/19.

## 2019-09-11 NOTE — PROGRESS NOTE BEHAVIORAL HEALTH - NSBHADMITMEDEDUDETAILS_A_CORE FT
risperidone risks/benefits discussed

## 2019-09-11 NOTE — PROGRESS NOTE BEHAVIORAL HEALTH - PROBLEM SELECTOR PROBLEM 1
Bipolar affective disorder, currently manic, severe, with psychotic features
Cellulitis and abscess of right leg

## 2019-09-11 NOTE — PROGRESS NOTE BEHAVIORAL HEALTH - NS ED BHA MED ROS RESPIRATORY
No complaints
Unable to assess
No complaints

## 2019-09-11 NOTE — PROGRESS NOTE BEHAVIORAL HEALTH - NS ED BHA MED ROS ALLERGIC IMMUNOLOGIC
No complaints
Unable to assess
No complaints

## 2019-09-13 ENCOUNTER — MED ADMIN CHARGE (OUTPATIENT)
Age: 34
End: 2019-09-13

## 2019-09-13 ENCOUNTER — APPOINTMENT (OUTPATIENT)
Dept: INFECTIOUS DISEASE | Facility: CLINIC | Age: 34
End: 2019-09-13

## 2019-09-13 ENCOUNTER — OUTPATIENT (OUTPATIENT)
Dept: OUTPATIENT SERVICES | Facility: HOSPITAL | Age: 34
LOS: 1 days | End: 2019-09-13

## 2019-09-13 DIAGNOSIS — F19.20 OTHER PSYCHOACTIVE SUBSTANCE DEPENDENCE, UNCOMPLICATED: ICD-10-CM

## 2019-09-13 DIAGNOSIS — L03.115 CELLULITIS OF RIGHT LOWER LIMB: ICD-10-CM

## 2019-09-13 DIAGNOSIS — Z21 ASYMPTOMATIC HUMAN IMMUNODEFICIENCY VIRUS [HIV] INFECTION STATUS: ICD-10-CM

## 2019-09-13 DIAGNOSIS — F31.2 BIPOLAR DISORDER, CURRENT EPISODE MANIC SEVERE WITH PSYCHOTIC FEATURES: ICD-10-CM

## 2019-09-13 DIAGNOSIS — B95.62 METHICILLIN RESISTANT STAPHYLOCOCCUS AUREUS INFECTION AS THE CAUSE OF DISEASES CLASSIFIED ELSEWHERE: ICD-10-CM

## 2019-09-13 DIAGNOSIS — A53.9 SYPHILIS, UNSPECIFIED: ICD-10-CM

## 2019-09-13 DIAGNOSIS — Z98.890 OTHER SPECIFIED POSTPROCEDURAL STATES: Chronic | ICD-10-CM

## 2019-09-13 DIAGNOSIS — R00.0 TACHYCARDIA, UNSPECIFIED: ICD-10-CM

## 2019-09-13 RX ORDER — PENICILLIN G BENZATHINE 2400000 [IU]/4ML
2400000 INJECTION, SUSPENSION INTRAMUSCULAR
Qty: 1 | Refills: 0 | Status: COMPLETED | OUTPATIENT
Start: 2019-09-13

## 2019-09-13 RX ADMIN — PENICILLIN G BENZATHINE 0 UNIT/4ML: 2400000 INJECTION, SUSPENSION INTRAMUSCULAR at 00:00

## 2019-09-19 ENCOUNTER — APPOINTMENT (OUTPATIENT)
Dept: INFECTIOUS DISEASE | Facility: CLINIC | Age: 34
End: 2019-09-19

## 2019-09-19 DIAGNOSIS — A52.9 LATE SYPHILIS, UNSPECIFIED: ICD-10-CM

## 2019-09-27 ENCOUNTER — OUTPATIENT (OUTPATIENT)
Dept: OUTPATIENT SERVICES | Facility: HOSPITAL | Age: 34
LOS: 1 days | End: 2019-09-27

## 2019-09-27 ENCOUNTER — APPOINTMENT (OUTPATIENT)
Dept: INFECTIOUS DISEASE | Facility: CLINIC | Age: 34
End: 2019-09-27

## 2019-09-27 VITALS
SYSTOLIC BLOOD PRESSURE: 131 MMHG | BODY MASS INDEX: 24.5 KG/M2 | WEIGHT: 175 LBS | DIASTOLIC BLOOD PRESSURE: 67 MMHG | TEMPERATURE: 97.1 F | RESPIRATION RATE: 14 BRPM | OXYGEN SATURATION: 96 % | HEIGHT: 71 IN | HEART RATE: 102 BPM

## 2019-09-27 DIAGNOSIS — Z98.890 OTHER SPECIFIED POSTPROCEDURAL STATES: Chronic | ICD-10-CM

## 2019-09-27 DIAGNOSIS — Z83.3 FAMILY HISTORY OF DIABETES MELLITUS: ICD-10-CM

## 2019-09-27 LAB
ALBUMIN SERPL ELPH-MCNC: 4 G/DL — SIGNIFICANT CHANGE UP (ref 3.3–5)
ALP SERPL-CCNC: 72 U/L — SIGNIFICANT CHANGE UP (ref 40–120)
ALT FLD-CCNC: 13 U/L — SIGNIFICANT CHANGE UP (ref 10–45)
ANION GAP SERPL CALC-SCNC: 11 MMOL/L — SIGNIFICANT CHANGE UP (ref 5–17)
AST SERPL-CCNC: 21 U/L — SIGNIFICANT CHANGE UP (ref 10–40)
BASOPHILS # BLD AUTO: 0.03 K/UL — SIGNIFICANT CHANGE UP (ref 0–0.2)
BASOPHILS NFR BLD AUTO: 0.4 % — SIGNIFICANT CHANGE UP (ref 0–2)
BILIRUB SERPL-MCNC: 0.4 MG/DL — SIGNIFICANT CHANGE UP (ref 0.2–1.2)
BUN SERPL-MCNC: 7 MG/DL — SIGNIFICANT CHANGE UP (ref 7–23)
CALCIUM SERPL-MCNC: 9.6 MG/DL — SIGNIFICANT CHANGE UP (ref 8.4–10.5)
CHLORIDE SERPL-SCNC: 103 MMOL/L — SIGNIFICANT CHANGE UP (ref 96–108)
CHOLEST SERPL-MCNC: 177 MG/DL — SIGNIFICANT CHANGE UP (ref 10–199)
CO2 SERPL-SCNC: 26 MMOL/L — SIGNIFICANT CHANGE UP (ref 22–31)
CREAT SERPL-MCNC: 0.92 MG/DL — SIGNIFICANT CHANGE UP (ref 0.5–1.3)
EOSINOPHIL # BLD AUTO: 0.23 K/UL — SIGNIFICANT CHANGE UP (ref 0–0.5)
EOSINOPHIL NFR BLD AUTO: 3.2 % — SIGNIFICANT CHANGE UP (ref 0–6)
GLUCOSE SERPL-MCNC: 116 MG/DL — HIGH (ref 70–99)
HBV SURFACE AG SER-ACNC: SIGNIFICANT CHANGE UP
HCT VFR BLD CALC: 40.8 % — SIGNIFICANT CHANGE UP (ref 39–50)
HCV AB S/CO SERPL IA: 0.07 S/CO — SIGNIFICANT CHANGE UP
HCV AB SERPL-IMP: SIGNIFICANT CHANGE UP
HDLC SERPL-MCNC: 49 MG/DL — SIGNIFICANT CHANGE UP
HGB BLD-MCNC: 12.7 G/DL — LOW (ref 13–17)
IMM GRANULOCYTES NFR BLD AUTO: 1.4 % — SIGNIFICANT CHANGE UP (ref 0–1.5)
LIPID PNL WITH DIRECT LDL SERPL: 98 MG/DL — SIGNIFICANT CHANGE UP
LYMPHOCYTES # BLD AUTO: 2.33 K/UL — SIGNIFICANT CHANGE UP (ref 1–3.3)
LYMPHOCYTES # BLD AUTO: 32.5 % — SIGNIFICANT CHANGE UP (ref 13–44)
MCHC RBC-ENTMCNC: 29.8 PG — SIGNIFICANT CHANGE UP (ref 27–34)
MCHC RBC-ENTMCNC: 31.1 GM/DL — LOW (ref 32–36)
MCV RBC AUTO: 95.8 FL — SIGNIFICANT CHANGE UP (ref 80–100)
MONOCYTES # BLD AUTO: 0.62 K/UL — SIGNIFICANT CHANGE UP (ref 0–0.9)
MONOCYTES NFR BLD AUTO: 8.7 % — SIGNIFICANT CHANGE UP (ref 2–14)
NEUTROPHILS # BLD AUTO: 3.85 K/UL — SIGNIFICANT CHANGE UP (ref 1.8–7.4)
NEUTROPHILS NFR BLD AUTO: 53.8 % — SIGNIFICANT CHANGE UP (ref 43–77)
NRBC # BLD: 0 /100 WBCS — SIGNIFICANT CHANGE UP (ref 0–0)
PLATELET # BLD AUTO: 290 K/UL — SIGNIFICANT CHANGE UP (ref 150–400)
POTASSIUM SERPL-MCNC: 4 MMOL/L — SIGNIFICANT CHANGE UP (ref 3.5–5.3)
POTASSIUM SERPL-SCNC: 4 MMOL/L — SIGNIFICANT CHANGE UP (ref 3.5–5.3)
PROT SERPL-MCNC: 7.5 G/DL — SIGNIFICANT CHANGE UP (ref 6–8.3)
RBC # BLD: 4.26 M/UL — SIGNIFICANT CHANGE UP (ref 4.2–5.8)
RBC # FLD: 15 % — HIGH (ref 10.3–14.5)
SODIUM SERPL-SCNC: 140 MMOL/L — SIGNIFICANT CHANGE UP (ref 135–145)
TOTAL CHOLESTEROL/HDL RATIO MEASUREMENT: 3.6 RATIO — SIGNIFICANT CHANGE UP (ref 3.4–9.6)
TRIGL SERPL-MCNC: 152 MG/DL — HIGH (ref 10–149)
TSH SERPL-MCNC: 0.7 UIU/ML — SIGNIFICANT CHANGE UP (ref 0.35–4.94)
WBC # BLD: 7.16 K/UL — SIGNIFICANT CHANGE UP (ref 3.8–10.5)
WBC # FLD AUTO: 7.16 K/UL — SIGNIFICANT CHANGE UP (ref 3.8–10.5)

## 2019-09-27 RX ORDER — RISPERIDONE 2 MG/1
2 TABLET, FILM COATED ORAL
Refills: 0 | Status: ACTIVE | COMMUNITY
Start: 2019-09-27

## 2019-09-28 LAB
4/8 RATIO: 0.32 RATIO — LOW (ref 0.9–3.6)
ABS CD8: 1207 /UL — HIGH (ref 142–740)
CD3 BLASTS SPEC-ACNC: 1622 /UL — SIGNIFICANT CHANGE UP (ref 672–1870)
CD3 BLASTS SPEC-ACNC: 76 % — SIGNIFICANT CHANGE UP (ref 59–83)
CD4 %: 18 % — LOW (ref 30–62)
CD8 %: 57 % — HIGH (ref 12–36)
ESTIMATED AVERAGE GLUCOSE: 105 MG/DL — SIGNIFICANT CHANGE UP (ref 68–114)
HAV IGG SER QL IA: REACTIVE
HBA1C BLD-MCNC: 5.3 % — SIGNIFICANT CHANGE UP (ref 4–5.6)
HBV CORE AB SER-ACNC: REACTIVE
HBV SURFACE AB SER-ACNC: REACTIVE — SIGNIFICANT CHANGE UP
HIV-1 VIRAL LOAD RESULT: ABNORMAL
HIV1 RNA # SERPL NAA+PROBE: 43 — SIGNIFICANT CHANGE UP
HIV1 RNA SER-IMP: SIGNIFICANT CHANGE UP
HIV1 RNA SERPL NAA+PROBE-ACNC: ABNORMAL
HIV1 RNA SERPL NAA+PROBE-LOG#: 1.63 — SIGNIFICANT CHANGE UP
T-CELL CD4 SUBSET PNL BLD: 381 /UL — LOW (ref 489–1457)

## 2019-09-29 ENCOUNTER — TRANSCRIPTION ENCOUNTER (OUTPATIENT)
Age: 34
End: 2019-09-29

## 2019-09-30 ENCOUNTER — MEDICATION RENEWAL (OUTPATIENT)
Age: 34
End: 2019-09-30

## 2019-09-30 LAB
GAMMA INTERFERON BACKGROUND BLD IA-ACNC: 0.12 IU/ML — SIGNIFICANT CHANGE UP
M TB IFN-G BLD-IMP: NEGATIVE — SIGNIFICANT CHANGE UP
M TB IFN-G CD4+ BCKGRND COR BLD-ACNC: 0.02 IU/ML — SIGNIFICANT CHANGE UP
M TB IFN-G CD4+CD8+ BCKGRND COR BLD-ACNC: 0.08 IU/ML — SIGNIFICANT CHANGE UP
MEV IGG SER-ACNC: 96.4 AU/ML — SIGNIFICANT CHANGE UP
MEV IGG+IGM SER-IMP: POSITIVE — SIGNIFICANT CHANGE UP
MUV AB SER-ACNC: POSITIVE — SIGNIFICANT CHANGE UP
MUV IGG FLD-ACNC: 86.8 AU/ML — SIGNIFICANT CHANGE UP
QUANT TB PLUS MITOGEN MINUS NIL: >10 IU/ML — SIGNIFICANT CHANGE UP
RUBV IGG SER-ACNC: 1.8 INDEX — SIGNIFICANT CHANGE UP
RUBV IGG SER-IMP: POSITIVE — SIGNIFICANT CHANGE UP

## 2019-10-07 ENCOUNTER — EMERGENCY (EMERGENCY)
Facility: HOSPITAL | Age: 34
LOS: 1 days | Discharge: LEFT BEFORE DISCHARGE | End: 2019-10-07
Attending: EMERGENCY MEDICINE | Admitting: EMERGENCY MEDICINE

## 2019-10-07 VITALS
RESPIRATION RATE: 18 BRPM | HEART RATE: 96 BPM | DIASTOLIC BLOOD PRESSURE: 71 MMHG | TEMPERATURE: 98 F | SYSTOLIC BLOOD PRESSURE: 129 MMHG | WEIGHT: 160.06 LBS | OXYGEN SATURATION: 98 % | HEIGHT: 71 IN

## 2019-10-07 DIAGNOSIS — Z98.890 OTHER SPECIFIED POSTPROCEDURAL STATES: Chronic | ICD-10-CM

## 2019-10-07 DIAGNOSIS — F15.20 OTHER STIMULANT DEPENDENCE, UNCOMPLICATED: ICD-10-CM

## 2019-10-07 DIAGNOSIS — A52.9 LATE SYPHILIS, UNSPECIFIED: ICD-10-CM

## 2019-10-07 DIAGNOSIS — Z83.3 FAMILY HISTORY OF DIABETES MELLITUS: ICD-10-CM

## 2019-10-07 DIAGNOSIS — B20 HUMAN IMMUNODEFICIENCY VIRUS [HIV] DISEASE: ICD-10-CM

## 2019-10-07 DIAGNOSIS — F31.9 BIPOLAR DISORDER, UNSPECIFIED: ICD-10-CM

## 2019-10-07 DIAGNOSIS — F19.90 OTHER PSYCHOACTIVE SUBSTANCE USE, UNSPECIFIED, UNCOMPLICATED: ICD-10-CM

## 2019-10-07 DIAGNOSIS — Z00.00 ENCOUNTER FOR GENERAL ADULT MEDICAL EXAMINATION WITHOUT ABNORMAL FINDINGS: ICD-10-CM

## 2019-10-07 DIAGNOSIS — Z72.0 TOBACCO USE: ICD-10-CM

## 2019-10-09 ENCOUNTER — EMERGENCY (EMERGENCY)
Facility: HOSPITAL | Age: 34
LOS: 1 days | Discharge: HOME | End: 2019-10-09
Attending: EMERGENCY MEDICINE | Admitting: EMERGENCY MEDICINE

## 2019-10-09 VITALS
RESPIRATION RATE: 18 BRPM | OXYGEN SATURATION: 98 % | HEIGHT: 71 IN | DIASTOLIC BLOOD PRESSURE: 74 MMHG | SYSTOLIC BLOOD PRESSURE: 133 MMHG | HEART RATE: 106 BPM | TEMPERATURE: 98 F | WEIGHT: 164.91 LBS

## 2019-10-09 DIAGNOSIS — Z98.890 OTHER SPECIFIED POSTPROCEDURAL STATES: Chronic | ICD-10-CM

## 2019-10-09 NOTE — ED ADULT TRIAGE NOTE - CHIEF COMPLAINT QUOTE
Pt states "I vaped some unknown substance and now my chest and my stomach feels weird. It happens only when I take a deep breath. It's not pain or nothing like that. I heard stories that people are dying from vaping. I don't feel like I'm dying but I want to get checked out".

## 2019-10-10 ENCOUNTER — CHART COPY (OUTPATIENT)
Age: 34
End: 2019-10-10

## 2019-10-12 DIAGNOSIS — R10.10 UPPER ABDOMINAL PAIN, UNSPECIFIED: ICD-10-CM

## 2019-10-15 DIAGNOSIS — R07.89 OTHER CHEST PAIN: ICD-10-CM

## 2019-10-15 DIAGNOSIS — F19.10 OTHER PSYCHOACTIVE SUBSTANCE ABUSE, UNCOMPLICATED: ICD-10-CM

## 2019-10-21 ENCOUNTER — EMERGENCY (EMERGENCY)
Facility: HOSPITAL | Age: 34
LOS: 1 days | Discharge: HOME | End: 2019-10-21
Admitting: EMERGENCY MEDICINE
Payer: MEDICAID

## 2019-10-21 ENCOUNTER — EMERGENCY (EMERGENCY)
Facility: HOSPITAL | Age: 34
LOS: 1 days | Discharge: HOME | End: 2019-10-21
Admitting: EMERGENCY MEDICINE

## 2019-10-21 VITALS
OXYGEN SATURATION: 95 % | RESPIRATION RATE: 20 BRPM | HEART RATE: 105 BPM | TEMPERATURE: 98 F | SYSTOLIC BLOOD PRESSURE: 141 MMHG | DIASTOLIC BLOOD PRESSURE: 76 MMHG

## 2019-10-21 VITALS
HEIGHT: 71 IN | DIASTOLIC BLOOD PRESSURE: 79 MMHG | WEIGHT: 160.06 LBS | RESPIRATION RATE: 18 BRPM | SYSTOLIC BLOOD PRESSURE: 128 MMHG | OXYGEN SATURATION: 100 % | HEART RATE: 116 BPM | TEMPERATURE: 98 F

## 2019-10-21 DIAGNOSIS — Z98.890 OTHER SPECIFIED POSTPROCEDURAL STATES: Chronic | ICD-10-CM
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
## 2019-10-21 DIAGNOSIS — Z53.21 PROCEDURE AND TREATMENT NOT CARRIED OUT DUE TO PATIENT LEAVING PRIOR TO BEING SEEN BY HEALTH CARE PROVIDER: ICD-10-CM

## 2019-10-21 DIAGNOSIS — S90.821A BLISTER (NONTHERMAL), RIGHT FOOT, INITIAL ENCOUNTER: ICD-10-CM

## 2019-10-21 DIAGNOSIS — B35.3 TINEA PEDIS: ICD-10-CM
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
## 2019-10-21 DIAGNOSIS — Z03.89 ENCOUNTER FOR OBSERVATION FOR OTHER SUSPECTED DISEASES AND CONDITIONS RULED OUT: ICD-10-CM

## 2019-10-21 PROCEDURE — 99053 MED SERV 10PM-8AM 24 HR FAC: CPT

## 2019-10-21 PROCEDURE — 99283 EMERGENCY DEPT VISIT LOW MDM: CPT

## 2019-10-21 PROCEDURE — 99282 EMERGENCY DEPT VISIT SF MDM: CPT

## 2019-10-21 NOTE — ED ADULT NURSE REASSESSMENT NOTE - NS ED NURSE REASSESS COMMENT FT1
Unable to complete nursing assessment due to patient not present on unit for assessment.
Went to greet patient for initial assessment. Patient not at bed assigned

## 2019-10-21 NOTE — ED PROVIDER NOTE - NSFOLLOWUPINSTRUCTIONS_ED_ALL_ED_FT
Athlete's foot (tinea pedis) is a fungal infection of the skin on your feet. It often occurs on the skin that is between or underneath the toes. It can also occur on the soles of your feet. The infection can spread from person to person (is contagious). It can also spread when a person's bare feet come in contact with the fungus on shower floors or on items such as shoes.  What are the causes?  This condition is caused by a fungus that grows in warm, moist places. You can get athlete's foot by sharing shoes, shower stalls, towels, and wet floors with someone who is infected. Not washing your feet or changing your socks often enough can also lead to athlete's foot.  What increases the risk?  This condition is more likely to develop in:  Men.People who have a weak body defense system (immune system).People who have diabetes.People who use public showers, such as at a gym.People who wear heavy-duty shoes, such as industrial or  shoes.Seasons with warm, humid weather.What are the signs or symptoms?  Symptoms of this condition include:  Itchy areas between your toes or on the soles of your feet.White, flaky, or scaly areas between your toes or on the soles of your feet.Very itchy small blisters between your toes or on the soles of your feet.Small cuts in your skin. These cuts can become infected.Thick or discolored toenails.How is this diagnosed?  This condition may be diagnosed with a physical exam and a review of your medical history. Your health care provider may also take a skin or toenail sample to examine under a microscope.  How is this treated?  This condition is treated with antifungal medicines. These may be applied as powders, ointments, or creams. In severe cases, an oral antifungal medicine may be given.  Follow these instructions at home:  Medicines     Apply or take over-the-counter and prescription medicines only as told by your health care provider.Apply your antifungal medicine as told by your health care provider. Do not stop using the antifungal even if your condition improves.Foot care     Do not scratch your feet.Keep your feet dry:  Wear cotton or wool socks. Change your socks every day or if they become wet.Wear shoes that allow air to flow, such as sandals or canvas tennis shoes.Wash and dry your feet, including the area between your toes. Also, wash and dry your feet:  Every day or as told by your health care provider.After exercising.General instructions     Do not let others use towels, shoes, nail clippers, or other personal items that touch your feet.Protect your feet by wearing sandals in wet areas, such as locker rooms and shared showers.Keep all follow-up visits as told by your health care provider. This is important.If you have diabetes, keep your blood sugar under control.Contact a health care provider if:  You have a fever.You have swelling, soreness, warmth, or redness in your foot.Your feet are not getting better with treatment.Your symptoms get worse.You have new symptoms.Summary  Athlete's foot (tinea pedis) is a fungal infection of the skin on your feet. It often occurs on skin that is between or underneath the toes.This condition is caused by a fungus that grows in warm, moist places.Symptoms include white, flaky, or scaly areas between your toes or on the soles of your feet.This condition is treated with antifungal medicines.Keep your feet clean. Always dry them thoroughly.This information is not intended to replace advice given to you by your health care provider. Make sure you discuss any questions you have with your health care provider.    Document Released: 12/15/2001 Document Revised: 10/08/2018 Document Reviewed: 10/08/2018  [employer] Interactive Patient Education © 2019 [employer]

## 2019-10-21 NOTE — ED PROVIDER NOTE - SKIN, MLM
Lateral aspect towards R heel: blister resolved, no acute signs of infection, no redness no swelling, no purulent drainage. 5th toe between web space with fungal infection without evidence of secondary infection. FROM, no motor sensory deficit. Lateral aspect towards R heel: blister resolved, no acute signs of infection, no redness no swelling, no purulent drainage. 5th toe between web space +fungal infection without evidence of secondary infection. FROM, no motor sensory deficit.

## 2019-10-21 NOTE — ED PROVIDER NOTE - PATIENT PORTAL LINK FT
You can access the FollowMyHealth Patient Portal offered by [facility] by registering at the following website: [email or web address]. By joining [employer]’s FollowMyHealth portal, you will also be able to view your health information using other applications (apps) compatible with our system.

## 2019-10-27 ENCOUNTER — EMERGENCY (EMERGENCY)
Facility: HOSPITAL | Age: 34
LOS: 1 days | Discharge: HOME | End: 2019-10-27
Admitting: EMERGENCY MEDICINE
Payer: MEDICAID

## 2019-10-27 VITALS
HEIGHT: 71 IN | DIASTOLIC BLOOD PRESSURE: 83 MMHG | HEART RATE: 95 BPM | TEMPERATURE: 98 F | SYSTOLIC BLOOD PRESSURE: 141 MMHG | WEIGHT: 154.98 LBS | OXYGEN SATURATION: 97 % | RESPIRATION RATE: 16 BRPM

## 2019-10-27 DIAGNOSIS — Z98.890 OTHER SPECIFIED POSTPROCEDURAL STATES: Chronic | ICD-10-CM

## 2019-10-27 PROCEDURE — 99282 EMERGENCY DEPT VISIT SF MDM: CPT

## 2019-10-27 PROCEDURE — 99053 MED SERV 10PM-8AM 24 HR FAC: CPT

## 2019-10-27 NOTE — ED PROVIDER NOTE - OBJECTIVE STATEMENT
35 yo M with pmh of HIV (VLUD) c/o rash to R foot for more than 1 week. Pt is concerned he has a staph infection because he was treated for one back in August. Denies fever, chills, pain, swelling. Pt seen in the ED on 10/21 for the same complaint, diagnosed with tinea pedis and given lotrimin.

## 2019-10-27 NOTE — ED PROVIDER NOTE - NSFOLLOWUPINSTRUCTIONS_ED_ALL_ED_FT
Athlete's Foot    WHAT YOU NEED TO KNOW:    Athlete's foot is a foot infection caused by a fungus.     DISCHARGE INSTRUCTIONS:    Return to the emergency department if:     You have a fever or chills.      You have red streaks going up your leg.    Contact your healthcare provider if:     Your infection spreads to other parts of your body.      Your infection is not better in 14 days or is not completely gone in 90 days.      The skin on your foot or leg is red and hot.      You have questions or concerns about your condition or care.    Medicines:     Antifungal medicine may be given as a cream or pill. You may need a doctor's order for this medicine. Take the medicine until it is gone, even if your feet look like they are healed.       Take your medicine as directed. Contact your healthcare provider if you think your medicine is not helping or if you have side effects. Tell him or her if you are allergic to any medicine. Keep a list of the medicines, vitamins, and herbs you take. Include the amounts, and when and why you take them. Bring the list or the pill bottles to follow-up visits. Carry your medicine list with you in case of an emergency.    Follow up with your healthcare provider as directed: Write down your questions so you remember to ask them during your visits.     Prevent the spread of athlete's foot:     Prevent the spread of this infection to other parts of your body. When you shower, dry your groin area and other parts of your body before you dry your feet.       Keep your feet clean and dry. Wash your feet each day and dry them well, especially between your toes. After your feet are dry, put powder on your feet and between your toes. Wear clean cotton or wool socks each day. Put your socks on first so you do not spread the infection to other areas of your body. Wear sandals, canvas tennis shoes, or other shoes that allow air to flow to your feet. This helps keep your feet dry. Do not use shoes that are tight, or made of plastic or rubber.       Soak your feet in an astringent (drying) solution as directed if you have blisters. You may need to do this for 20 to 30 minutes, 2 times each day to help dry out the blisters.       Wear shoes in public areas. Wear shower shoes or sandals in warm, damp areas. This includes shower stalls, near swimming pools, and locker rooms. Do not share socks or shoes. Do not use public swimming pools.

## 2019-10-27 NOTE — ED ADULT NURSE NOTE - OBJECTIVE STATEMENT
Pt came in to ED c/o R heel rash/lesion. Pt reports having rash/lesion for couple of days. Peripheral pulse present, no swelling, lesion to heel and toe noted. Denies pain.

## 2019-10-27 NOTE — ED PROVIDER NOTE - PHYSICAL EXAMINATION
CONSTITUTIONAL: Well-appearing; well-nourished; in no apparent distress.   HEAD: Normocephalic; atraumatic.   EYES: PERRL; EOM intact; conjunctiva and sclera clear  ENT: normal nose; no rhinorrhea; normal pharynx with no erythema or lesions.   NECK: Supple; non-tender;   CARDIOVASCULAR: Normal S1, S2; no murmurs, rubs, or gallops. Regular rate and rhythm.   RESPIRATORY: Breathing easily; breath sounds clear and equal bilaterally; no wheezes, rhonchi, or rales.  MSK: FROM at all extremities, normal tone   EXT: No cyanosis or edema; N/V intact  SKIN:  Lateral aspect towards R heel: +callus with hyperpigmented skin, no acute signs of infection, no redness no swelling, no purulent drainage. 5th toe between web space +fungal infection without evidence of secondary infection. with associated calluses on 1st and 2nd toes; FROM, no motor sensory deficit.

## 2019-10-27 NOTE — ED PROVIDER NOTE - CLINICAL SUMMARY MEDICAL DECISION MAKING FREE TEXT BOX
33 yo M with pmh of HIV (VLUD) c/o rash to R foot for more than 1 week. Pt is concerned he has a staph infection because he was treated for one back in August. Denies fever, chills, pain, swelling. Pt seen in the ED on 10/21 for the same complaint, diagnosed with tinea pedis and given lotrimin. Lateral aspect towards R heel: +callus with hyperpigmented skin, no acute signs of infection, no redness no swelling, no purulent drainage. 5th toe between web space +fungal infection without evidence of secondary infection. with associated calluses on 1st and 2nd toes; FROM, no motor sensory deficit. Exam findings c/w tinea pedis, do not expect cellulitis. Pt has appt with his pmd on Tuesday

## 2019-10-27 NOTE — ED ADULT NURSE NOTE - CHPI ED NUR SYMPTOMS NEG
no petechia/no decreased eating/drinking/no scaly patches on skin/no inflammation/no itching/no fever/no body aches/no chills/no confusion/no pain/no vomiting

## 2019-10-27 NOTE — ED PROVIDER NOTE - CARE PROVIDER_API CALL
Betito Vogel (JASM)  Podiatric Medicine and Surgery  [address], [address]  [place] [address]  Phone: [phone]  Fax: [phone]  Follow Up Time:

## 2019-10-28 ENCOUNTER — APPOINTMENT (OUTPATIENT)
Dept: INFECTIOUS DISEASE | Facility: CLINIC | Age: 34
End: 2019-10-28

## 2019-10-31 PROCEDURE — 87075 CULTR BACTERIA EXCEPT BLOOD: CPT

## 2019-10-31 PROCEDURE — 85610 PROTHROMBIN TIME: CPT

## 2019-10-31 PROCEDURE — 80307 DRUG TEST PRSMV CHEM ANLYZR: CPT

## 2019-10-31 PROCEDURE — 80048 BASIC METABOLIC PNL TOTAL CA: CPT

## 2019-10-31 PROCEDURE — 86592 SYPHILIS TEST NON-TREP QUAL: CPT

## 2019-10-31 PROCEDURE — 87186 SC STD MICRODIL/AGAR DIL: CPT

## 2019-10-31 PROCEDURE — 87070 CULTURE OTHR SPECIMN AEROBIC: CPT

## 2019-10-31 PROCEDURE — 93005 ELECTROCARDIOGRAM TRACING: CPT

## 2019-10-31 PROCEDURE — 85027 COMPLETE CBC AUTOMATED: CPT

## 2019-10-31 PROCEDURE — 99285 EMERGENCY DEPT VISIT HI MDM: CPT | Mod: 25

## 2019-10-31 PROCEDURE — 86780 TREPONEMA PALLIDUM: CPT

## 2019-10-31 PROCEDURE — 82962 GLUCOSE BLOOD TEST: CPT

## 2019-10-31 PROCEDURE — 85025 COMPLETE CBC W/AUTO DIFF WBC: CPT

## 2019-10-31 PROCEDURE — 86359 T CELLS TOTAL COUNT: CPT

## 2019-10-31 PROCEDURE — 83735 ASSAY OF MAGNESIUM: CPT

## 2019-10-31 PROCEDURE — 86593 SYPHILIS TEST NON-TREP QUANT: CPT

## 2019-10-31 PROCEDURE — 87040 BLOOD CULTURE FOR BACTERIA: CPT

## 2019-10-31 PROCEDURE — 73701 CT LOWER EXTREMITY W/DYE: CPT

## 2019-10-31 PROCEDURE — 73630 X-RAY EXAM OF FOOT: CPT

## 2019-10-31 PROCEDURE — 93971 EXTREMITY STUDY: CPT

## 2019-10-31 PROCEDURE — 96365 THER/PROPH/DIAG IV INF INIT: CPT

## 2019-10-31 PROCEDURE — 85730 THROMBOPLASTIN TIME PARTIAL: CPT

## 2019-10-31 PROCEDURE — 80053 COMPREHEN METABOLIC PANEL: CPT

## 2019-10-31 PROCEDURE — 83605 ASSAY OF LACTIC ACID: CPT

## 2019-10-31 PROCEDURE — 36415 COLL VENOUS BLD VENIPUNCTURE: CPT

## 2019-10-31 PROCEDURE — 87591 N.GONORRHOEAE DNA AMP PROB: CPT

## 2019-11-01 DIAGNOSIS — B35.3 TINEA PEDIS: ICD-10-CM

## 2019-11-01 DIAGNOSIS — R21 RASH AND OTHER NONSPECIFIC SKIN ERUPTION: ICD-10-CM

## 2019-11-06 ENCOUNTER — MEDICATION RENEWAL (OUTPATIENT)
Age: 34
End: 2019-11-06

## 2019-11-06 ENCOUNTER — APPOINTMENT (OUTPATIENT)
Dept: INFECTIOUS DISEASE | Facility: CLINIC | Age: 34
End: 2019-11-06

## 2019-11-14 ENCOUNTER — MEDICATION RENEWAL (OUTPATIENT)
Age: 34
End: 2019-11-14

## 2019-11-14 ENCOUNTER — APPOINTMENT (OUTPATIENT)
Dept: INFECTIOUS DISEASE | Facility: CLINIC | Age: 34
End: 2019-11-14

## 2019-11-16 ENCOUNTER — EMERGENCY (EMERGENCY)
Facility: HOSPITAL | Age: 34
LOS: 1 days | Discharge: HOME | End: 2019-11-16
Admitting: EMERGENCY MEDICINE
Payer: MEDICAID

## 2019-11-16 VITALS
SYSTOLIC BLOOD PRESSURE: 149 MMHG | DIASTOLIC BLOOD PRESSURE: 84 MMHG | WEIGHT: 160.06 LBS | TEMPERATURE: 98 F | RESPIRATION RATE: 18 BRPM | HEART RATE: 89 BPM | OXYGEN SATURATION: 100 %

## 2019-11-16 VITALS
OXYGEN SATURATION: 98 % | SYSTOLIC BLOOD PRESSURE: 131 MMHG | TEMPERATURE: 98 F | RESPIRATION RATE: 18 BRPM | DIASTOLIC BLOOD PRESSURE: 68 MMHG | HEART RATE: 100 BPM

## 2019-11-16 DIAGNOSIS — G62.9 POLYNEUROPATHY, UNSPECIFIED: ICD-10-CM

## 2019-11-16 DIAGNOSIS — Z79.1 LONG TERM (CURRENT) USE OF NON-STEROIDAL ANTI-INFLAMMATORIES (NSAID): ICD-10-CM

## 2019-11-16 DIAGNOSIS — M79.672 PAIN IN LEFT FOOT: ICD-10-CM

## 2019-11-16 DIAGNOSIS — Z98.890 OTHER SPECIFIED POSTPROCEDURAL STATES: Chronic | ICD-10-CM

## 2019-11-16 DIAGNOSIS — Z79.899 OTHER LONG TERM (CURRENT) DRUG THERAPY: ICD-10-CM

## 2019-11-16 DIAGNOSIS — B20 HUMAN IMMUNODEFICIENCY VIRUS [HIV] DISEASE: ICD-10-CM

## 2019-11-16 DIAGNOSIS — Z79.2 LONG TERM (CURRENT) USE OF ANTIBIOTICS: ICD-10-CM

## 2019-11-16 PROCEDURE — 96372 THER/PROPH/DIAG INJ SC/IM: CPT

## 2019-11-16 PROCEDURE — 99284 EMERGENCY DEPT VISIT MOD MDM: CPT | Mod: 25

## 2019-11-16 PROCEDURE — 99283 EMERGENCY DEPT VISIT LOW MDM: CPT

## 2019-11-16 RX ORDER — CEPHALEXIN 500 MG
1 CAPSULE ORAL
Qty: 30 | Refills: 0
Start: 2019-11-16 | End: 2019-11-25

## 2019-11-16 RX ORDER — KETOROLAC TROMETHAMINE 30 MG/ML
30 SYRINGE (ML) INJECTION ONCE
Refills: 0 | Status: DISCONTINUED | OUTPATIENT
Start: 2019-11-16 | End: 2019-11-16

## 2019-11-16 RX ORDER — AZTREONAM 2 G
1 VIAL (EA) INJECTION
Qty: 20 | Refills: 0
Start: 2019-11-16 | End: 2019-11-25

## 2019-11-16 RX ADMIN — Medication 300 MILLIGRAM(S): at 14:58

## 2019-11-16 RX ADMIN — Medication 30 MILLIGRAM(S): at 14:58

## 2019-11-16 RX ADMIN — Medication 30 MILLIGRAM(S): at 15:17

## 2019-11-16 NOTE — ED PROVIDER NOTE - PATIENT PORTAL LINK FT
You can access the FollowMyHealth Patient Portal offered by [facility] by registering at the following website: [email or web address]. By joining [employer]’s FollowMyHealth portal, you will also be able to view your health information using other applications (apps) compatible with our system.

## 2019-11-16 NOTE — ED ADULT NURSE NOTE - OBJECTIVE STATEMENT
pt reports b/l foot pain x 1 month.  pt states left worse than right.  pt denies injury, reports hx of MRSA infection in right foot.   pt states he has HIV and has not had any medications in several weeks.   2+ b/l dp pulses, no open wounds noted on feet.  mild swelling left foot.  pt appears unkempt.

## 2019-11-16 NOTE — ED ADULT NURSE NOTE - NSIMPLEMENTINTERV_GEN_ALL_ED
Implemented All Universal Safety Interventions:  [place] to call system. Call bell, personal items and telephone within reach. Instruct patient to call for assistance. Room bathroom lighting operational. Non-slip footwear when patient is off stretcher. Physically safe environment: no spills, clutter or unnecessary equipment. Stretcher in lowest position, wheels locked, appropriate side rails in place.

## 2019-11-16 NOTE — ED PROVIDER NOTE - CLINICAL SUMMARY MEDICAL DECISION MAKING FREE TEXT BOX
Patient with left foot early cellulitis afebrile with no palpable collection. Recommend warm soaks , skin cleansing and start abx therapy. Rest, elevate and f/u with PMD.

## 2019-11-16 NOTE — ED PROVIDER NOTE - CARE PLAN
Principal Discharge DX:	Foot pain, left  Secondary Diagnosis:	HIV (human immunodeficiency virus infection)  Secondary Diagnosis:	Neuropathy

## 2019-11-16 NOTE — ED PROVIDER NOTE - OBJECTIVE STATEMENT
34y M with a history of HIV for 12 years and neuropathy presents to the ED with complains of left foot pain onset one week ago. Patient notes associated swelling and redness. Denies fever, trauma, problems with breathing, chest pain, nausea, or vomiting. 34y M with a history of HIV for 12 years and neuropathy presents to the ED with complains of left foot pain onset one week ago. Patient notes associated swelling and redness. Denies fever, trauma,  chest pain, nausea, or vomiting, no calf pain, numbness. + tingling sensation. H/o prior cellulitis and MRSA

## 2019-11-16 NOTE — ED PROVIDER NOTE - SKIN, MLM
Skin normal color for race, warm, dry and intact. No evidence of rash. Left foot appears minimally swollen compared to right foot, with increased warmth on anterior aspect of mid foot; no open wounds or abrasions visualized; no drainage, no source of palpable collection; distal pulses are intact; no redness or swelling to the sole or foot.

## 2019-11-16 NOTE — ED PROVIDER NOTE - NSFOLLOWUPINSTRUCTIONS_ED_ALL_ED_FT
[facility]    Cellulitis, Adult  Image   Cellulitis is a skin infection. The infected area is often warm, red, swollen, and sore. It occurs most often in the arms and lower legs. It is very important to get treated for this condition.  What are the causes?  This condition is caused by bacteria. The bacteria enter through a break in the skin, such as a cut, burn, insect bite, open sore, or crack.  What increases the risk?  This condition is more likely to occur in people who:  Have a weak body defense system (immune system). Have open cuts, burns, bites, or scrapes on the skin.Are older than 60 years of age.Have a blood sugar problem (diabetes). Have a long-lasting (chronic) liver disease (cirrhosis) or kidney disease.Are very overweight (obese).Have a skin problem, such as:  Itchy rash (eczema).Slow movement of blood in the veins (venous stasis).Fluid buildup below the skin (edema).Have been treated with high-energy rays (radiation).Use IV drugs. What are the signs or symptoms?  Symptoms of this condition include:  Skin that is:  Red.Streaking.Spotting.Swollen.Sore or painful when you touch it.Warm.A fever.Chills. Blisters.How is this diagnosed?  This condition is diagnosed based on:  Medical history.Physical exam.Blood tests.Imaging tests.How is this treated?  Treatment for this condition may include:  Medicines to treat infections or allergies.Home care, such as:  Rest.Placing cold or warm cloths (compresses) on the skin.Hospital care, if the condition is very bad.Follow these instructions at home:  Medicines     Take over-the-counter and prescription medicines only as told by your doctor.If you were prescribed an antibiotic medicine, take it as told by your doctor. Do not stop taking it even if you start to feel better.General instructions     Image   Drink enough fluid to keep your pee (urine) pale yellow.Do not touch or rub the infected area.Raise (elevate) the infected area above the level of your heart while you are sitting or lying down.Place cold or warm cloths on the area as told by your doctor.Keep all follow-up visits as told by your doctor. This is important.Contact a doctor if:  You have a fever.You do not start to get better after 1–2 days of treatment.Your bone or joint under the infected area starts to hurt after the skin has healed.Your infection comes back. This can happen in the same area or another area.You have a swollen bump in the area.You have new symptoms.You feel ill and have muscle aches and pains.Get help right away if:  Your symptoms get worse.You feel very sleepy.You throw up (vomit) or have watery poop (diarrhea) for a long time.You see red streaks coming from the area.Your red area gets larger.Your red area turns dark in color.These symptoms may represent a serious problem that is an emergency. Do not wait to see if the symptoms will go away. Get medical help right away. Call your local emergency services (911 in the [place] Do not drive yourself to the hospital.   Summary  Cellulitis is a skin infection. The area is often warm, red, swollen, and sore.This condition is treated with medicines, rest, and cold and warm cloths.Take all medicines only as told by your doctor.Tell your doctor if symptoms do not start to get better after 1–2 days of treatment.This information is not intended to replace advice given to you by your health care provider. Make sure you discuss any questions you have with your health care provider.    Document Released: 06/05/2009 Document Revised: 05/09/2019 Document Reviewed: 05/09/2019  [employer] Interactive Patient Education © 2019 [employer]

## 2019-11-17 ENCOUNTER — EMERGENCY (EMERGENCY)
Facility: HOSPITAL | Age: 34
LOS: 1 days | Discharge: HOME | End: 2019-11-17
Admitting: EMERGENCY MEDICINE
Payer: MEDICAID

## 2019-11-17 VITALS
SYSTOLIC BLOOD PRESSURE: 115 MMHG | HEART RATE: 97 BPM | OXYGEN SATURATION: 98 % | DIASTOLIC BLOOD PRESSURE: 56 MMHG | TEMPERATURE: 98 F | RESPIRATION RATE: 17 BRPM

## 2019-11-17 DIAGNOSIS — Z98.890 OTHER SPECIFIED POSTPROCEDURAL STATES: Chronic | ICD-10-CM

## 2019-11-17 PROCEDURE — 99283 EMERGENCY DEPT VISIT LOW MDM: CPT

## 2019-11-17 PROCEDURE — 99053 MED SERV 10PM-8AM 24 HR FAC: CPT

## 2019-11-17 RX ORDER — IBUPROFEN 200 MG
600 TABLET ORAL ONCE
Refills: 0 | Status: COMPLETED | OUTPATIENT
Start: 2019-11-17 | End: 2019-11-17

## 2019-11-17 RX ADMIN — Medication 600 MILLIGRAM(S): at 04:30

## 2019-11-17 RX ADMIN — Medication 600 MILLIGRAM(S): at 04:38

## 2019-11-17 NOTE — ED PROVIDER NOTE - NSFOLLOWUPINSTRUCTIONS_ED_ALL_ED_FT
Foot Pain  Many things can cause foot pain. Some common causes are:  An injury.A sprain.Arthritis.Blisters.Bunions.Follow these instructions at home:  Pay attention to any changes in your symptoms. Take these actions to help with your discomfort:  If directed, put ice on the affected area:  Put ice in a plastic bag.Place a towel between your skin and the bag.Leave the ice on for 15–20 minutes, 3?4 times a day for 2 days.Take over-the-counter and prescription medicines only as told by your health care provider.Wear comfortable, supportive shoes that fit you well. Do not wear high heels.Do not stand or walk for long periods of time.Do not lift a lot of weight. This can put added pressure on your feet.Do stretches to relieve foot pain and stiffness as told by your health care provider.Rub your foot gently.Keep your feet clean and dry.Contact a health care provider if:  Your pain does not get better after a few days of self-care.Your pain gets worse.You cannot stand on your foot.Get help right away if:  Your foot is numb or tingling.Your foot or toes are swollen.Your foot or toes turn white or blue.You have warmth and redness along your foot.

## 2019-11-17 NOTE — ED PROVIDER NOTE - PATIENT PORTAL LINK FT
You can access the FollowMyHealth Patient Portal offered by [facility] by registering at the following website: [email or web address]. By joining [employer]’s FollowMyHealth portal, you will also be able to view your health information using other applications (apps) compatible with our system.

## 2019-11-17 NOTE — ED PROVIDER NOTE - MUSCULOSKELETAL, MLM
Spine appears normal, range of motion is not limited, no muscle or joint tenderness; L foot: min swelling over top of foot, pedal pulse 2+, FROM, no warmth, no tend, normal gait

## 2019-11-17 NOTE — ED PROVIDER NOTE - CARE PROVIDER_API CALL
Betito Vogel (JASM)  Podiatric Medicine and Surgery  [address], [address]  [place] [address]  Phone: [phone]  Fax: [phone]  Follow Up Time:

## 2019-11-17 NOTE — ED PROVIDER NOTE - OBJECTIVE STATEMENT
The pt is a 33 y/o undomicile M, who returns to ED stating "I want pain medicine for my foot" - seen earlier today. Denies fall, injury, decreased ROM, fevers, chills

## 2019-11-17 NOTE — ED PROVIDER NOTE - CLINICAL SUMMARY MEDICAL DECISION MAKING FREE TEXT BOX
nacho m requesting pain meds for his foot pain - seen and dc'd earlier for same, noted to be sleeping in chair and requesting food, suspect 2/2 gain for visit, given ibuprofen, to f/u w/podiatry clinic

## 2019-11-20 ENCOUNTER — MEDICATION RENEWAL (OUTPATIENT)
Age: 34
End: 2019-11-20

## 2019-11-21 ENCOUNTER — APPOINTMENT (OUTPATIENT)
Dept: INFECTIOUS DISEASE | Facility: CLINIC | Age: 34
End: 2019-11-21

## 2019-11-21 DIAGNOSIS — M79.672 PAIN IN LEFT FOOT: ICD-10-CM

## 2019-11-22 ENCOUNTER — APPOINTMENT (OUTPATIENT)
Dept: INFECTIOUS DISEASE | Facility: CLINIC | Age: 34
End: 2019-11-22

## 2019-11-22 ENCOUNTER — OUTPATIENT (OUTPATIENT)
Dept: OUTPATIENT SERVICES | Facility: HOSPITAL | Age: 34
LOS: 1 days | End: 2019-11-22

## 2019-11-22 ENCOUNTER — MED ADMIN CHARGE (OUTPATIENT)
Age: 34
End: 2019-11-22

## 2019-11-22 VITALS
RESPIRATION RATE: 14 BRPM | HEART RATE: 76 BPM | HEIGHT: 71 IN | DIASTOLIC BLOOD PRESSURE: 59 MMHG | BODY MASS INDEX: 23.24 KG/M2 | OXYGEN SATURATION: 98 % | SYSTOLIC BLOOD PRESSURE: 133 MMHG | WEIGHT: 166 LBS | TEMPERATURE: 96.7 F

## 2019-11-22 DIAGNOSIS — F41.9 ANXIETY DISORDER, UNSPECIFIED: ICD-10-CM

## 2019-11-22 DIAGNOSIS — Z23 ENCOUNTER FOR IMMUNIZATION: ICD-10-CM

## 2019-11-22 DIAGNOSIS — Z98.890 OTHER SPECIFIED POSTPROCEDURAL STATES: Chronic | ICD-10-CM

## 2019-11-22 DIAGNOSIS — Z72.0 TOBACCO USE: ICD-10-CM

## 2019-11-22 DIAGNOSIS — Z00.00 ENCOUNTER FOR GENERAL ADULT MEDICAL EXAMINATION W/OUT ABNORMAL FINDINGS: ICD-10-CM

## 2019-11-22 DIAGNOSIS — F19.90 OTHER PSYCHOACTIVE SUBSTANCE USE, UNSPECIFIED, UNCOMPLICATED: ICD-10-CM

## 2019-12-02 DIAGNOSIS — F41.9 ANXIETY DISORDER, UNSPECIFIED: ICD-10-CM

## 2019-12-02 DIAGNOSIS — F19.90 OTHER PSYCHOACTIVE SUBSTANCE USE, UNSPECIFIED, UNCOMPLICATED: ICD-10-CM

## 2019-12-02 DIAGNOSIS — F15.20 OTHER STIMULANT DEPENDENCE, UNCOMPLICATED: ICD-10-CM

## 2019-12-02 DIAGNOSIS — Z00.00 ENCOUNTER FOR GENERAL ADULT MEDICAL EXAMINATION WITHOUT ABNORMAL FINDINGS: ICD-10-CM

## 2019-12-02 DIAGNOSIS — Z23 ENCOUNTER FOR IMMUNIZATION: ICD-10-CM

## 2019-12-02 DIAGNOSIS — B20 HUMAN IMMUNODEFICIENCY VIRUS [HIV] DISEASE: ICD-10-CM

## 2019-12-02 DIAGNOSIS — A52.9 LATE SYPHILIS, UNSPECIFIED: ICD-10-CM

## 2019-12-02 DIAGNOSIS — F31.9 BIPOLAR DISORDER, UNSPECIFIED: ICD-10-CM

## 2019-12-02 DIAGNOSIS — Z72.0 TOBACCO USE: ICD-10-CM

## 2019-12-05 NOTE — PATIENT PROFILE ADULT - LIVING ENVIRONMENT
Afib    CAD (coronary artery disease)    Diabetes    GERD (gastroesophageal reflux disease)    HTN (hypertension)    MDS (myelodysplastic syndrome) no

## 2019-12-18 ENCOUNTER — APPOINTMENT (OUTPATIENT)
Dept: INFECTIOUS DISEASE | Facility: CLINIC | Age: 34
End: 2019-12-18

## 2020-01-02 ENCOUNTER — RX RENEWAL (OUTPATIENT)
Age: 35
End: 2020-01-02

## 2020-01-08 ENCOUNTER — EMERGENCY (EMERGENCY)
Facility: HOSPITAL | Age: 35
LOS: 1 days | Discharge: HOME | End: 2020-01-08
Admitting: EMERGENCY MEDICINE
Payer: MEDICAID

## 2020-01-08 VITALS
HEART RATE: 115 BPM | RESPIRATION RATE: 18 BRPM | DIASTOLIC BLOOD PRESSURE: 72 MMHG | HEIGHT: 71 IN | TEMPERATURE: 98 F | SYSTOLIC BLOOD PRESSURE: 122 MMHG | WEIGHT: 154.98 LBS

## 2020-01-08 VITALS
RESPIRATION RATE: 18 BRPM | SYSTOLIC BLOOD PRESSURE: 127 MMHG | DIASTOLIC BLOOD PRESSURE: 85 MMHG | OXYGEN SATURATION: 98 % | HEART RATE: 106 BPM

## 2020-01-08 DIAGNOSIS — Z98.890 OTHER SPECIFIED POSTPROCEDURAL STATES: Chronic | ICD-10-CM

## 2020-01-08 LAB
FLU A RESULT: SIGNIFICANT CHANGE UP
FLU A RESULT: SIGNIFICANT CHANGE UP
FLUAV AG NPH QL: SIGNIFICANT CHANGE UP
FLUBV AG NPH QL: SIGNIFICANT CHANGE UP
RSV RESULT: SIGNIFICANT CHANGE UP
RSV RNA RESP QL NAA+PROBE: SIGNIFICANT CHANGE UP

## 2020-01-08 PROCEDURE — 99283 EMERGENCY DEPT VISIT LOW MDM: CPT

## 2020-01-08 PROCEDURE — 71046 X-RAY EXAM CHEST 2 VIEWS: CPT | Mod: 26

## 2020-01-08 PROCEDURE — 99283 EMERGENCY DEPT VISIT LOW MDM: CPT | Mod: 25

## 2020-01-08 PROCEDURE — 99053 MED SERV 10PM-8AM 24 HR FAC: CPT

## 2020-01-08 PROCEDURE — 87631 RESP VIRUS 3-5 TARGETS: CPT

## 2020-01-08 PROCEDURE — 71046 X-RAY EXAM CHEST 2 VIEWS: CPT

## 2020-01-08 NOTE — ED PROVIDER NOTE - OBJECTIVE STATEMENT
34 y m PMHX HIV (CD4 381, VL 43) c/o cough and fatigue for "few days"  - does not think he has fever, and not SOB.  Pain in the sternum only upon coughing.  He says he has thick sputum of "different colors" but no hemoptysis.  Mild runny nose and scratchy throat.  No NVD. No body aches.   No abdominal or back pain.  Also reports a tender bump on right buttock for the past 2 days, started draining tonight. 34 y m PMHX HIV (CD4 381, VL 43) c/o cough and fatigue for "few days"  - does not think he has fever, and not SOB.  Pain in the sternum only upon coughing.  He says he has thick sputum of "different colors" but no hemoptysis.  Mild runny nose and scratchy throat.  No NVD. No body aches.   No abdominal or back pain.  Also reports a tender bump on right buttock for the past 2 days, started draining tonight.  Admits to using crystal meth yesterday.

## 2020-01-08 NOTE — ED PROVIDER NOTE - PHYSICAL EXAMINATION
CONSTITUTIONAL: WD,WN. NAD.    SKIN: Normal color and turgor. No rash.  Mildly tender, draining abscess right buttock; no surrounding erythema.    HEAD: NC/AT.  EYES: Conjunctiva clear. EOMI. PERRL.    ENT: Airway patent, OP without erythema, tonsillar swelling or exudate; uvula midline without swelling. Nasal mucosa clear, no rhinorrhea.   RESPIRATORY:  Breathing non-labored. No retractions or accessory muscle use.  Lungs CTA bilat.  CARDIOVASCULAR:  RRR, S1S2. No M/R/G.      GI:  Abdomen soft, nontender.    MSK: Neck supple with painless ROM.  No lower extremity edema or calf tenderness.  No joint swelling or ROM limitation.  NEURO: Alert and oriented; CN II-XII grossly intact. Speech clear. 5/5 strength in all extremities.  Normal balance and gait.

## 2020-01-08 NOTE — ED PROVIDER NOTE - CLINICAL SUMMARY MEDICAL DECISION MAKING FREE TEXT BOX
Cough - appears nontoxic.  Vague historian.  Normal work of breathing and lungs clear.  Hx of HIV, will get CXR.   Also with draining right buttock abscess, no surrounding cellultiis, will culture and start PO abx due to hx of HIV.

## 2020-01-08 NOTE — ED ADULT NURSE NOTE - OBJECTIVE STATEMENT
Patient aox3 and ambulatory. Patient c/o abscess to R buttocks that has recently started draining. Patient's abscess is approximately an inch in diameter. Patient states "I don't know how long it has been there, but it started draining today." Patient also c/o productive cough and sore throat x2weeks. Per patient, PMH: HIV +. Patient denies SOB, CP, dizziness, N/V/D, accident/injury. Patient has clear and equal bilateral lung sounds. Patient denies any drug or alcohol use. Patient is displaying fidgety and jittery behavior.

## 2020-01-08 NOTE — ED ADULT TRIAGE NOTE - CHIEF COMPLAINT QUOTE
pt states "I have a strong cough. I need MD to examine. I also have a boil on my bottom that erupted".

## 2020-01-08 NOTE — ED PROVIDER NOTE - NS ED ROS FT
CONSTITUTIONAL: No fever, chills, or weakness  NEURO: No headache, no dizziness, no syncope; No focal weakness/tingling/numbness  EYES: No visual changes  ENT: HPI  PULM: HPI  CV: No chest pain or palpitations  GI: No abdominal pain, vomiting, or diarrhea  : No dysuria, hematuria, frequency  MSK: No neck pain or back pain, no joint pain  SKIN: bump on right buttock

## 2020-01-08 NOTE — ED ADULT NURSE NOTE - NSIMPLEMENTINTERV_GEN_ALL_ED
Implemented All Universal Safety Interventions:  [place] to call system. Call bell, personal items and telephone within reach. Instruct patient to call for assistance. Room bathroom lighting operational. Non-slip footwear when patient is off stretcher. Physically safe environment: no spills, clutter or unnecessary equipment. Stretcher in lowest position, wheels locked, appropriate side rails in place.

## 2020-01-08 NOTE — ED PROVIDER NOTE - DIAGNOSTIC INTERPRETATION
lungs clear, normal heart size and mediastinum, no acute bony abnormality. suspected sweatshirt "ríos strings" seen on frontal view.

## 2020-01-08 NOTE — ED ADULT NURSE NOTE - NS ED NURSE ELOPE COMMENTS
Patient eloped after chest xray performed. Patient did not notify anyone when he left. No IV in place at time of depart.

## 2020-01-17 DIAGNOSIS — R05 COUGH: ICD-10-CM

## 2020-01-17 DIAGNOSIS — L02.31 CUTANEOUS ABSCESS OF BUTTOCK: ICD-10-CM

## 2020-01-27 ENCOUNTER — RESULT REVIEW (OUTPATIENT)
Age: 35
End: 2020-01-27

## 2020-01-28 ENCOUNTER — OUTPATIENT (OUTPATIENT)
Dept: OUTPATIENT SERVICES | Facility: HOSPITAL | Age: 35
LOS: 1 days | End: 2020-01-28
Payer: COMMERCIAL

## 2020-01-28 ENCOUNTER — APPOINTMENT (OUTPATIENT)
Dept: INFECTIOUS DISEASE | Facility: CLINIC | Age: 35
End: 2020-01-28
Payer: MEDICAID

## 2020-01-28 VITALS
HEART RATE: 140 BPM | OXYGEN SATURATION: 95 % | SYSTOLIC BLOOD PRESSURE: 102 MMHG | TEMPERATURE: 100.3 F | DIASTOLIC BLOOD PRESSURE: 68 MMHG

## 2020-01-28 DIAGNOSIS — R00.0 TACHYCARDIA, UNSPECIFIED: ICD-10-CM

## 2020-01-28 DIAGNOSIS — Z72.51 HIGH RISK HETEROSEXUAL BEHAVIOR: ICD-10-CM

## 2020-01-28 DIAGNOSIS — Z98.890 OTHER SPECIFIED POSTPROCEDURAL STATES: Chronic | ICD-10-CM

## 2020-01-28 DIAGNOSIS — F31.9 BIPOLAR DISORDER, UNSPECIFIED: ICD-10-CM

## 2020-01-28 DIAGNOSIS — A52.9 LATE SYPHILIS, UNSPECIFIED: ICD-10-CM

## 2020-01-28 DIAGNOSIS — F17.200 NICOTINE DEPENDENCE, UNSPECIFIED, UNCOMPLICATED: ICD-10-CM

## 2020-01-28 LAB
ALBUMIN SERPL ELPH-MCNC: 4.4 G/DL — SIGNIFICANT CHANGE UP (ref 3.3–5)
ALP SERPL-CCNC: 76 U/L — SIGNIFICANT CHANGE UP (ref 40–120)
ALT FLD-CCNC: 16 U/L — SIGNIFICANT CHANGE UP (ref 10–45)
ANION GAP SERPL CALC-SCNC: 16 MMOL/L — SIGNIFICANT CHANGE UP (ref 5–17)
APPEARANCE UR: ABNORMAL
AST SERPL-CCNC: 20 U/L — SIGNIFICANT CHANGE UP (ref 10–40)
BACTERIA # UR AUTO: PRESENT /HPF
BASOPHILS # BLD AUTO: 0.02 K/UL — SIGNIFICANT CHANGE UP (ref 0–0.2)
BASOPHILS NFR BLD AUTO: 0.2 % — SIGNIFICANT CHANGE UP (ref 0–2)
BILIRUB SERPL-MCNC: 0.6 MG/DL — SIGNIFICANT CHANGE UP (ref 0.2–1.2)
BILIRUB UR-MCNC: ABNORMAL
BUN SERPL-MCNC: 9 MG/DL — SIGNIFICANT CHANGE UP (ref 7–23)
CALCIUM SERPL-MCNC: 10 MG/DL — SIGNIFICANT CHANGE UP (ref 8.4–10.5)
CHLORIDE SERPL-SCNC: 97 MMOL/L — SIGNIFICANT CHANGE UP (ref 96–108)
CO2 SERPL-SCNC: 24 MMOL/L — SIGNIFICANT CHANGE UP (ref 22–31)
COLOR SPEC: ABNORMAL
COMMENT - URINE: SIGNIFICANT CHANGE UP
COMMENT - URINE: SIGNIFICANT CHANGE UP
CREAT SERPL-MCNC: 1.08 MG/DL — SIGNIFICANT CHANGE UP (ref 0.5–1.3)
DIFF PNL FLD: NEGATIVE — SIGNIFICANT CHANGE UP
EOSINOPHIL # BLD AUTO: 0.03 K/UL — SIGNIFICANT CHANGE UP (ref 0–0.5)
EOSINOPHIL NFR BLD AUTO: 0.2 % — SIGNIFICANT CHANGE UP (ref 0–6)
EPI CELLS # UR: SIGNIFICANT CHANGE UP /HPF (ref 0–5)
GLUCOSE SERPL-MCNC: 85 MG/DL — SIGNIFICANT CHANGE UP (ref 70–99)
GLUCOSE UR QL: NEGATIVE — SIGNIFICANT CHANGE UP
HAV IGM SER-ACNC: SIGNIFICANT CHANGE UP
HBV CORE IGM SER-ACNC: SIGNIFICANT CHANGE UP
HBV SURFACE AG SER-ACNC: SIGNIFICANT CHANGE UP
HCT VFR BLD CALC: 40.4 % — SIGNIFICANT CHANGE UP (ref 39–50)
HCV AB S/CO SERPL IA: 0.14 S/CO — SIGNIFICANT CHANGE UP
HCV AB SERPL-IMP: SIGNIFICANT CHANGE UP
HGB BLD-MCNC: 13.3 G/DL — SIGNIFICANT CHANGE UP (ref 13–17)
HYALINE CASTS # UR AUTO: SIGNIFICANT CHANGE UP /LPF (ref 0–2)
IMM GRANULOCYTES NFR BLD AUTO: 0.6 % — SIGNIFICANT CHANGE UP (ref 0–1.5)
KETONES UR-MCNC: ABNORMAL MG/DL
LEUKOCYTE ESTERASE UR-ACNC: NEGATIVE — SIGNIFICANT CHANGE UP
LYMPHOCYTES # BLD AUTO: 1.85 K/UL — SIGNIFICANT CHANGE UP (ref 1–3.3)
LYMPHOCYTES # BLD AUTO: 14.7 % — SIGNIFICANT CHANGE UP (ref 13–44)
MCHC RBC-ENTMCNC: 28.7 PG — SIGNIFICANT CHANGE UP (ref 27–34)
MCHC RBC-ENTMCNC: 32.9 GM/DL — SIGNIFICANT CHANGE UP (ref 32–36)
MCV RBC AUTO: 87.1 FL — SIGNIFICANT CHANGE UP (ref 80–100)
MONOCYTES # BLD AUTO: 1.37 K/UL — HIGH (ref 0–0.9)
MONOCYTES NFR BLD AUTO: 10.9 % — SIGNIFICANT CHANGE UP (ref 2–14)
NEUTROPHILS # BLD AUTO: 9.26 K/UL — HIGH (ref 1.8–7.4)
NEUTROPHILS NFR BLD AUTO: 73.4 % — SIGNIFICANT CHANGE UP (ref 43–77)
NITRITE UR-MCNC: NEGATIVE — SIGNIFICANT CHANGE UP
NRBC # BLD: 0 /100 WBCS — SIGNIFICANT CHANGE UP (ref 0–0)
PH UR: 6.5 — SIGNIFICANT CHANGE UP (ref 5–8)
PLATELET # BLD AUTO: 402 K/UL — HIGH (ref 150–400)
POTASSIUM SERPL-MCNC: 4.6 MMOL/L — SIGNIFICANT CHANGE UP (ref 3.5–5.3)
POTASSIUM SERPL-SCNC: 4.6 MMOL/L — SIGNIFICANT CHANGE UP (ref 3.5–5.3)
PROT SERPL-MCNC: 8.6 G/DL — HIGH (ref 6–8.3)
PROT UR-MCNC: 100 MG/DL
RBC # BLD: 4.64 M/UL — SIGNIFICANT CHANGE UP (ref 4.2–5.8)
RBC # FLD: 14.3 % — SIGNIFICANT CHANGE UP (ref 10.3–14.5)
RBC CASTS # UR COMP ASSIST: < 5 /HPF — SIGNIFICANT CHANGE UP
SODIUM SERPL-SCNC: 137 MMOL/L — SIGNIFICANT CHANGE UP (ref 135–145)
SP GR SPEC: 1.02 — SIGNIFICANT CHANGE UP (ref 1–1.03)
UROBILINOGEN FLD QL: 1 E.U./DL — SIGNIFICANT CHANGE UP
WBC # BLD: 12.61 K/UL — HIGH (ref 3.8–10.5)
WBC # FLD AUTO: 12.61 K/UL — HIGH (ref 3.8–10.5)
WBC UR QL: ABNORMAL /HPF

## 2020-01-28 PROCEDURE — 99214 OFFICE O/P EST MOD 30 MIN: CPT | Mod: GC

## 2020-01-28 PROCEDURE — 88112 CYTOPATH CELL ENHANCE TECH: CPT | Mod: 26

## 2020-01-29 LAB
4/8 RATIO: 0.19 RATIO — LOW (ref 0.9–3.6)
ABS CD8: 1156 /UL — HIGH (ref 142–740)
C TRACH RRNA SPEC QL NAA+PROBE: SIGNIFICANT CHANGE UP
C TRACH+GC RRNA SPEC QL PROBE: SIGNIFICANT CHANGE UP
CD3 BLASTS SPEC-ACNC: 1402 /UL — SIGNIFICANT CHANGE UP (ref 672–1870)
CD3 BLASTS SPEC-ACNC: 78 % — SIGNIFICANT CHANGE UP (ref 59–83)
CD4 %: 12 % — LOW (ref 30–62)
CD8 %: 65 % — HIGH (ref 12–36)
CHLAMYDIA/N. GONORRHEA, ORAL/THROAT, TMA - SOURCE ORAL: SIGNIFICANT CHANGE UP
HIV-1 VIRAL LOAD RESULT: ABNORMAL
HIV1 RNA # SERPL NAA+PROBE: 142 — SIGNIFICANT CHANGE UP
HIV1 RNA SER-IMP: SIGNIFICANT CHANGE UP
HIV1 RNA SERPL NAA+PROBE-ACNC: ABNORMAL
HIV1 RNA SERPL NAA+PROBE-LOG#: 2.15 — SIGNIFICANT CHANGE UP
N GONORRHOEA RRNA SPEC QL NAA+PROBE: SIGNIFICANT CHANGE UP
T PALLIDUM AB TITR SER: POSITIVE
T-CELL CD4 SUBSET PNL BLD: 216 /UL — LOW (ref 489–1457)

## 2020-01-30 LAB
C TRACH RRNA SPEC QL NAA+PROBE: DETECTED
C TRACH+GC RRNA ANAL QL PROBE: SIGNIFICANT CHANGE UP
CHLAMYDIA/N. GONORRHEA, ANAL/RECTAL, TMA - SOURCE ANAL: SIGNIFICANT CHANGE UP
N GONORRHOEA RRNA SPEC QL NAA+PROBE: SIGNIFICANT CHANGE UP

## 2020-01-30 NOTE — HISTORY OF PRESENT ILLNESS
[FreeTextEntry1] : Medication refill and labwork [de-identified] : 34 year old male with PMH HIV with VL 43 and CD4 381 in September 2019 on Biktarvy (endorses compliance), late syphilis and bipolar disorder who presents for medication refill and lab work.  The patient is complaining of mild sore throat but no other symptoms at this time. He also would like a prescription for Chantix, the patient states he smokes 1 cigarette daily.  He endorses that he used methamphetamine two days ago and that he is MSM.  The patient completed treated for his syphilis previously.  He has no other issues at this time.

## 2020-01-30 NOTE — ASSESSMENT
[FreeTextEntry1] : 34 year old male with PMH HIV with VL 43 and CD4 381 in September 2019 on Biktarvy (endorses compliance), late syphilis and bipolar disorder who presents for medication refill and lab work.

## 2020-01-30 NOTE — REVIEW OF SYSTEMS
[Sore Throat] : sore throat [Fever] : no fever [Chills] : no chills [Fatigue] : no fatigue [Night Sweats] : no night sweats [Discharge] : no discharge [Pain] : no pain [Earache] : no earache [Vision Problems] : no vision problems [Hearing Loss] : no hearing loss [Nasal Discharge] : no nasal discharge [Chest Pain] : no chest pain [Orthopena] : no orthopnea [Palpitations] : no palpitations [Shortness Of Breath] : no shortness of breath [Wheezing] : no wheezing [Cough] : no cough [Dyspnea on Exertion] : not dyspnea on exertion [Abdominal Pain] : no abdominal pain [Constipation] : no constipation [Nausea] : no nausea [Heartburn] : no heartburn [Vomiting] : no vomiting [Hematuria] : no hematuria [Dysuria] : no dysuria [Joint Pain] : no joint pain [Back Pain] : no back pain [Joint Stiffness] : no joint stiffness [Joint Swelling] : no joint swelling [Itching] : no itching [Skin Rash] : no skin rash [Headache] : no headache [Memory Loss] : no memory loss [FreeTextEntry4] : Mild sore throat

## 2020-01-30 NOTE — PHYSICAL EXAM
[Normal Sclera/Conjunctiva] : normal sclera/conjunctiva [Normal Outer Ear/Nose] : the outer ears and nose were normal in appearance [No Accessory Muscle Use] : no accessory muscle use [No Respiratory Distress] : no respiratory distress  [Clear to Auscultation] : lungs were clear to auscultation bilaterally [Regular Rhythm] : with a regular rhythm [Normal S1, S2] : normal S1 and S2 [Soft] : abdomen soft [No Murmur] : no murmur heard [Non Tender] : non-tender [Normal Supraclavicular Nodes] : no supraclavicular lymphadenopathy [Non-distended] : non-distended [Normal Anterior Cervical Nodes] : no anterior cervical lymphadenopathy [No CVA Tenderness] : no CVA  tenderness [de-identified] : Very fidgety, mumbling under his breath and difficult to understand [de-identified] : Tachycardic to 120-140 but regular [de-identified] : Small 2x1cm rounded cystic lesion underneath the skin the R axilla [de-identified] : Tangential, not making eye contact

## 2020-01-31 ENCOUNTER — OUTPATIENT (OUTPATIENT)
Dept: OUTPATIENT SERVICES | Facility: HOSPITAL | Age: 35
LOS: 1 days | End: 2020-01-31

## 2020-01-31 ENCOUNTER — APPOINTMENT (OUTPATIENT)
Dept: INFECTIOUS DISEASE | Facility: CLINIC | Age: 35
End: 2020-01-31

## 2020-01-31 VITALS
OXYGEN SATURATION: 98 % | DIASTOLIC BLOOD PRESSURE: 78 MMHG | HEART RATE: 90 BPM | RESPIRATION RATE: 14 BRPM | SYSTOLIC BLOOD PRESSURE: 122 MMHG

## 2020-01-31 DIAGNOSIS — B20 HUMAN IMMUNODEFICIENCY VIRUS [HIV] DISEASE: ICD-10-CM

## 2020-01-31 DIAGNOSIS — F15.20 OTHER STIMULANT DEPENDENCE, UNCOMPLICATED: ICD-10-CM

## 2020-01-31 DIAGNOSIS — D72.821 MONOCYTOSIS (SYMPTOMATIC): ICD-10-CM

## 2020-01-31 DIAGNOSIS — D72.9 DISORDER OF WHITE BLOOD CELLS, UNSPECIFIED: ICD-10-CM

## 2020-01-31 DIAGNOSIS — D72.829 ELEVATED WHITE BLOOD CELL COUNT, UNSPECIFIED: ICD-10-CM

## 2020-01-31 DIAGNOSIS — Z98.890 OTHER SPECIFIED POSTPROCEDURAL STATES: Chronic | ICD-10-CM

## 2020-01-31 DIAGNOSIS — R82.90 UNSPECIFIED ABNORMAL FINDINGS IN URINE: ICD-10-CM

## 2020-01-31 DIAGNOSIS — A56.3 CHLAMYDIAL INFECTION OF ANUS AND RECTUM: ICD-10-CM

## 2020-01-31 LAB — NON-GYNECOLOGICAL CYTOLOGY STUDY: SIGNIFICANT CHANGE UP

## 2020-01-31 RX ORDER — CEFTRIAXONE 250 MG/1
250 INJECTION, POWDER, FOR SOLUTION INTRAMUSCULAR; INTRAVENOUS
Qty: 1 | Refills: 0 | Status: COMPLETED | OUTPATIENT
Start: 2020-01-31

## 2020-01-31 RX ORDER — AZITHROMYCIN 500 MG/1
500 TABLET, FILM COATED ORAL
Refills: 0 | Status: COMPLETED | OUTPATIENT
Start: 2020-01-31

## 2020-01-31 RX ADMIN — CEFTRIAXONE SODIUM 1 MG: 250 INJECTION, POWDER, FOR SOLUTION INTRAMUSCULAR; INTRAVENOUS at 00:00

## 2020-02-04 DIAGNOSIS — R00.0 TACHYCARDIA, UNSPECIFIED: ICD-10-CM

## 2020-02-04 DIAGNOSIS — F17.200 NICOTINE DEPENDENCE, UNSPECIFIED, UNCOMPLICATED: ICD-10-CM

## 2020-02-04 DIAGNOSIS — Z72.51 HIGH RISK HETEROSEXUAL BEHAVIOR: ICD-10-CM

## 2020-02-04 DIAGNOSIS — R31.9 HEMATURIA, UNSPECIFIED: ICD-10-CM

## 2020-02-04 DIAGNOSIS — A52.9 LATE SYPHILIS, UNSPECIFIED: ICD-10-CM

## 2020-02-04 DIAGNOSIS — B20 HUMAN IMMUNODEFICIENCY VIRUS [HIV] DISEASE: ICD-10-CM

## 2020-02-04 PROBLEM — D72.829 LEUKOCYTOSIS: Status: ACTIVE | Noted: 2020-01-31

## 2020-02-04 PROBLEM — R82.90 ABNORMAL URINALYSIS: Status: ACTIVE | Noted: 2020-01-31

## 2020-02-04 PROBLEM — D72.821 MONOCYTOSIS: Status: ACTIVE | Noted: 2020-01-31

## 2020-02-04 PROBLEM — D72.9 NEUTROPHILIA: Status: ACTIVE | Noted: 2020-01-31

## 2020-02-04 PROBLEM — F15.20 METHAMPHETAMINE ADDICTION: Status: ACTIVE | Noted: 2019-09-27

## 2020-02-04 PROBLEM — A56.3 CHLAMYDIAL INFECTION OF ANUS AND RECTUM: Status: ACTIVE | Noted: 2020-01-31

## 2020-02-05 DIAGNOSIS — A56.3 CHLAMYDIAL INFECTION OF ANUS AND RECTUM: ICD-10-CM

## 2020-02-05 DIAGNOSIS — D72.821 MONOCYTOSIS (SYMPTOMATIC): ICD-10-CM

## 2020-02-05 DIAGNOSIS — R82.90 UNSPECIFIED ABNORMAL FINDINGS IN URINE: ICD-10-CM

## 2020-02-05 DIAGNOSIS — D72.9 DISORDER OF WHITE BLOOD CELLS, UNSPECIFIED: ICD-10-CM

## 2020-02-05 DIAGNOSIS — B20 HUMAN IMMUNODEFICIENCY VIRUS [HIV] DISEASE: ICD-10-CM

## 2020-02-05 DIAGNOSIS — D72.829 ELEVATED WHITE BLOOD CELL COUNT, UNSPECIFIED: ICD-10-CM

## 2020-02-05 DIAGNOSIS — F15.20 OTHER STIMULANT DEPENDENCE, UNCOMPLICATED: ICD-10-CM

## 2020-02-06 ENCOUNTER — APPOINTMENT (OUTPATIENT)
Dept: INFECTIOUS DISEASE | Facility: CLINIC | Age: 35
End: 2020-02-06

## 2020-03-12 NOTE — PROGRESS NOTE BEHAVIORAL HEALTH - NSBHFUPINTERVALHXFT_PSY_A_CORE
Refilled medication until next OV.    In the morning, the patient was pleasant on approach, with good eye contact, and focused on the conversation. The patient reported being in a "good mood" and was "ready to go". He states that he attended groups but goes to his room because interacting with the other and the environment causes him to be anxious because of the "negative energy". He notes that he is sleeping great and that the medication is agreeing with him without any side effects. He notes that he was never told in the past about being diagnosed with bipolar but states that it "makes more sense now". Patient reports that his foot swelling and pain resolved 1 week ago but the skin in the area is peeling and has taken to not leaving the area open to air. He was also concerned about his CD4 count and requested for additional blood work to see if the count increased with his foot healing.     Per staff over the weekend, the pt was compliant with medications and was in control of his behavior. He was mostly pleasant and was more engaged with peers. At one point, the pt was irritable, frustrated, and discharge focused, which was hard to redirect. Patient did report "body aches" that needed PRN meds. In the morning, the patient was pleasant on approach, with good eye contact, and focused on the conversation. The patient reported being in a "good mood" and was "ready to go". He states that he attended groups but goes to his room because interacting with the other and the environment causes him to be anxious because of the "negative energy". He notes that he is sleeping great and that the medication is agreeing with him without any side effects. He notes that he was never told in the past about being diagnosed with bipolar but states that it "makes more sense now". Patient reports that his foot swelling and pain resolved 1 week ago but the skin in the area is peeling and has taken to not leaving the area open to air. He was also concerned about his CD4 count and requested for additional blood work to see if the count increased with his foot healing.   Per staff over the weekend, the pt was compliant with medications and was in control of his behavior. He was mostly pleasant and was more engaged with peers. At one point, the pt was irritable, frustrated, and discharge focused, which was hard to redirect. Patient did report "body aches" that needed PRN meds.

## 2020-03-16 ENCOUNTER — RX RENEWAL (OUTPATIENT)
Age: 35
End: 2020-03-16

## 2020-03-16 RX ORDER — BICTEGRAVIR SODIUM, EMTRICITABINE, AND TENOFOVIR ALAFENAMIDE FUMARATE 50; 200; 25 MG/1; MG/1; MG/1
50-200-25 TABLET ORAL
Qty: 90 | Refills: 0 | Status: ACTIVE | COMMUNITY
Start: 2019-09-27 | End: 1900-01-01

## 2020-04-02 ENCOUNTER — RESULT CHARGE (OUTPATIENT)
Age: 35
End: 2020-04-02

## 2020-04-06 ENCOUNTER — EMERGENCY (EMERGENCY)
Facility: HOSPITAL | Age: 35
LOS: 1 days | Discharge: HOME | End: 2020-04-06
Attending: EMERGENCY MEDICINE | Admitting: EMERGENCY MEDICINE
Payer: MEDICAID

## 2020-04-06 VITALS
HEART RATE: 83 BPM | WEIGHT: 184.97 LBS | OXYGEN SATURATION: 99 % | RESPIRATION RATE: 19 BRPM | HEIGHT: 71 IN | SYSTOLIC BLOOD PRESSURE: 157 MMHG | DIASTOLIC BLOOD PRESSURE: 93 MMHG | TEMPERATURE: 98 F

## 2020-04-06 DIAGNOSIS — Z98.890 OTHER SPECIFIED POSTPROCEDURAL STATES: Chronic | ICD-10-CM

## 2020-04-06 LAB
ALBUMIN SERPL ELPH-MCNC: 4.1 G/DL — SIGNIFICANT CHANGE UP (ref 3.3–5)
ALP SERPL-CCNC: 87 U/L — SIGNIFICANT CHANGE UP (ref 40–120)
ALT FLD-CCNC: 14 U/L — SIGNIFICANT CHANGE UP (ref 10–45)
ANION GAP SERPL CALC-SCNC: 10 MMOL/L — SIGNIFICANT CHANGE UP (ref 5–17)
AST SERPL-CCNC: 24 U/L — SIGNIFICANT CHANGE UP (ref 10–40)
BASOPHILS # BLD AUTO: 0.01 K/UL — SIGNIFICANT CHANGE UP (ref 0–0.2)
BASOPHILS NFR BLD AUTO: 0.2 % — SIGNIFICANT CHANGE UP (ref 0–2)
BILIRUB SERPL-MCNC: 0.2 MG/DL — SIGNIFICANT CHANGE UP (ref 0.2–1.2)
BUN SERPL-MCNC: 13 MG/DL — SIGNIFICANT CHANGE UP (ref 7–23)
CALCIUM SERPL-MCNC: 9.2 MG/DL — SIGNIFICANT CHANGE UP (ref 8.4–10.5)
CHLORIDE SERPL-SCNC: 102 MMOL/L — SIGNIFICANT CHANGE UP (ref 96–108)
CO2 SERPL-SCNC: 26 MMOL/L — SIGNIFICANT CHANGE UP (ref 22–31)
CREAT SERPL-MCNC: 0.93 MG/DL — SIGNIFICANT CHANGE UP (ref 0.5–1.3)
EOSINOPHIL # BLD AUTO: 0.31 K/UL — SIGNIFICANT CHANGE UP (ref 0–0.5)
EOSINOPHIL NFR BLD AUTO: 5.1 % — SIGNIFICANT CHANGE UP (ref 0–6)
GLUCOSE SERPL-MCNC: 108 MG/DL — HIGH (ref 70–99)
HCT VFR BLD CALC: 42.6 % — SIGNIFICANT CHANGE UP (ref 39–50)
HGB BLD-MCNC: 13.2 G/DL — SIGNIFICANT CHANGE UP (ref 13–17)
IMM GRANULOCYTES NFR BLD AUTO: 0.3 % — SIGNIFICANT CHANGE UP (ref 0–1.5)
LACTATE SERPL-SCNC: 1 MMOL/L — SIGNIFICANT CHANGE UP (ref 0.5–2)
LYMPHOCYTES # BLD AUTO: 2.42 K/UL — SIGNIFICANT CHANGE UP (ref 1–3.3)
LYMPHOCYTES # BLD AUTO: 39.9 % — SIGNIFICANT CHANGE UP (ref 13–44)
MCHC RBC-ENTMCNC: 28.4 PG — SIGNIFICANT CHANGE UP (ref 27–34)
MCHC RBC-ENTMCNC: 31 GM/DL — LOW (ref 32–36)
MCV RBC AUTO: 91.6 FL — SIGNIFICANT CHANGE UP (ref 80–100)
MONOCYTES # BLD AUTO: 0.8 K/UL — SIGNIFICANT CHANGE UP (ref 0–0.9)
MONOCYTES NFR BLD AUTO: 13.2 % — SIGNIFICANT CHANGE UP (ref 2–14)
NEUTROPHILS # BLD AUTO: 2.5 K/UL — SIGNIFICANT CHANGE UP (ref 1.8–7.4)
NEUTROPHILS NFR BLD AUTO: 41.3 % — LOW (ref 43–77)
NRBC # BLD: 0 /100 WBCS — SIGNIFICANT CHANGE UP (ref 0–0)
PLATELET # BLD AUTO: 327 K/UL — SIGNIFICANT CHANGE UP (ref 150–400)
POTASSIUM SERPL-MCNC: 4.3 MMOL/L — SIGNIFICANT CHANGE UP (ref 3.5–5.3)
POTASSIUM SERPL-SCNC: 4.3 MMOL/L — SIGNIFICANT CHANGE UP (ref 3.5–5.3)
PROT SERPL-MCNC: 7.6 G/DL — SIGNIFICANT CHANGE UP (ref 6–8.3)
RBC # BLD: 4.65 M/UL — SIGNIFICANT CHANGE UP (ref 4.2–5.8)
RBC # FLD: 14.1 % — SIGNIFICANT CHANGE UP (ref 10.3–14.5)
SODIUM SERPL-SCNC: 138 MMOL/L — SIGNIFICANT CHANGE UP (ref 135–145)
WBC # BLD: 6.06 K/UL — SIGNIFICANT CHANGE UP (ref 3.8–10.5)
WBC # FLD AUTO: 6.06 K/UL — SIGNIFICANT CHANGE UP (ref 3.8–10.5)

## 2020-04-06 PROCEDURE — 99283 EMERGENCY DEPT VISIT LOW MDM: CPT

## 2020-04-06 PROCEDURE — 99284 EMERGENCY DEPT VISIT MOD MDM: CPT

## 2020-04-06 PROCEDURE — 85025 COMPLETE CBC W/AUTO DIFF WBC: CPT

## 2020-04-06 PROCEDURE — 36415 COLL VENOUS BLD VENIPUNCTURE: CPT

## 2020-04-06 PROCEDURE — 83605 ASSAY OF LACTIC ACID: CPT

## 2020-04-06 PROCEDURE — 71045 X-RAY EXAM CHEST 1 VIEW: CPT | Mod: 26

## 2020-04-06 PROCEDURE — 99053 MED SERV 10PM-8AM 24 HR FAC: CPT

## 2020-04-06 PROCEDURE — 71045 X-RAY EXAM CHEST 1 VIEW: CPT

## 2020-04-06 PROCEDURE — 80053 COMPREHEN METABOLIC PANEL: CPT

## 2020-04-06 NOTE — ED PROVIDER NOTE - PROGRESS NOTE DETAILS
romaine: pt received at sign out from dr torrez as pending labs, cxr, covid test and dc - hemodynamically stable, no hypoxia Nuris: received s/o. labs grossly wnl, pt very well appearing, vitals wnl, even w/ ambulating. Clinically no indication for further emergent ED workup or hospitalization at this time. Comfortable for dc, outpt f/u. Nuris: received s/o. labs grossly wnl, pt very well appearing, vitals wnl, even w/ ambulating. Clinically no indication for further emergent ED workup or hospitalization at this time. Comfortable for dc, outpt f/u.  Pt had refused COVID testing. d/w pt that he should assume he has and to qurantine for 2 weeks.

## 2020-04-06 NOTE — ED PROVIDER NOTE - CLINICAL SUMMARY MEDICAL DECISION MAKING FREE TEXT BOX
avss. nontoxic. NAD. no active cp. no acute resp distress. last cd4 216 (1/29/20) just started on haart/bactrim. limited covid sample given pt uncooperative.    dispo: pending labs, cxr, reassessment

## 2020-04-06 NOTE — ED PROVIDER NOTE - PHYSICAL EXAMINATION
CONST: nontoxic NAD speaking in full sentences  HEAD: atraumatic  EYES: conjunctivae clear, PERRL, EOMI  ENT: mmm  NECK: supple/FROM, nttp  CARD: rrr no murmurs  CHEST: ctab no r/r/w, no stridor/retractions/tripoding  ABD: soft, nd, nttp, no rebound/guarding  EXT: FROM, symmetric distal pulses intact  SKIN: warm, dry, no rash, no pedal edema, cap refill <2sec  NEURO: a+ox3, 5/5 strength x4, gross sensation intact x4, normal gait

## 2020-04-06 NOTE — ED PROVIDER NOTE - OBJECTIVE STATEMENT
34M poor historian, undomiciled, MSM, hiv (last cd4 216, 1/29/20) started on haart/bactrim, polysubstance abuse (none today), anxiety, c/o 2-3d subjective fever/chills/cough/malaise/myalgias and now intermittent exertional sob.

## 2020-04-06 NOTE — ED PROVIDER NOTE - CARE PLAN
Principal Discharge DX:	Viral syndrome  Secondary Diagnosis:	HIV (human immunodeficiency virus infection)

## 2020-04-06 NOTE — ED ADULT NURSE REASSESSMENT NOTE - NS ED NURSE REASSESS COMMENT FT1
Pt. refused COVID-19 testing stating that he can not tolerate having the qtip placed up his nostrils.  Pt. was educated on how the test was to be performed to ensure accuracy, MD Remy also provided education but pt. continued to refuse testing.  Pt. had all blood work collected and sent to lab.  Will continue to monitor.

## 2020-04-06 NOTE — ED ADULT NURSE NOTE - OBJECTIVE STATEMENT
35 y/o male received into the ED, axox3, stating that he is having body aches, headache and cold like symptoms for the past 3 days.  Pt. states that he has history of HIV and did not know his last viral count.  Pt. appears very shaky and under the influence of drugs.  Pt. denies using any today.

## 2020-04-06 NOTE — ED PROVIDER NOTE - NSFOLLOWUPINSTRUCTIONS_ED_ALL_ED_FT
YOU WILL HEAR BACK WITH YOUR COVID19 RESULTS IN 1-7 DAYS. PLEASE REMAIN IN QUARANTINE PENDING YOUR RESULTS AND ABATEMENT OF FEVER x 24HR.    PLEASE REST AND REMAIN HYDRATED (EG, GATORADE, PEDIALYTE, ETC). TYLENOL AS NEEDED FOR FEVER (>100.4F/>38C). CONTINUE HAART THERAPY.    PLEASE FOLLOW-UP WITH YOUR PCP IN 1-2 DAYS.    PLEASE RETURN TO THE EMERGENCY DEPARTMENT IF SOMNOLENCE, CHEST PAIN, SHORTNESS OF BREATH, ABDOMINAL PAIN, VOMITING, OTHER CONCERNING SYMPTOMS.

## 2020-04-06 NOTE — ED PROVIDER NOTE - PATIENT PORTAL LINK FT
You can access the FollowMyHealth Patient Portal offered by [facility] by registering at the following website: [email or web address]. By joining [employer]’s FollowMyHealth portal, you will also be able to view your health information using other applications (apps) compatible with our system.

## 2020-04-06 NOTE — ED ADULT TRIAGE NOTE - ARRIVAL INFO ADDITIONAL COMMENTS
PT states she started with epigastric pain/discomfort yesterday. PT tried taking tums and other antiacids without relief. PT denies SOB/difficulty breathing. PT denies n/v/d. PT awake, alert, appropriate on cart with initial assessment.  PT declined blanket Denies coughing

## 2020-04-10 DIAGNOSIS — Z79.2 LONG TERM (CURRENT) USE OF ANTIBIOTICS: ICD-10-CM

## 2020-04-10 DIAGNOSIS — B20 HUMAN IMMUNODEFICIENCY VIRUS [HIV] DISEASE: ICD-10-CM

## 2020-04-10 DIAGNOSIS — Z79.899 OTHER LONG TERM (CURRENT) DRUG THERAPY: ICD-10-CM

## 2020-04-10 DIAGNOSIS — B34.9 VIRAL INFECTION, UNSPECIFIED: ICD-10-CM

## 2020-04-10 DIAGNOSIS — Z79.1 LONG TERM (CURRENT) USE OF NON-STEROIDAL ANTI-INFLAMMATORIES (NSAID): ICD-10-CM

## 2020-04-10 DIAGNOSIS — R50.9 FEVER, UNSPECIFIED: ICD-10-CM

## 2020-12-18 ENCOUNTER — NON-APPOINTMENT (OUTPATIENT)
Age: 35
End: 2020-12-18

## 2021-01-06 NOTE — ED ADULT NURSE NOTE - NSFALLRSKPASTHIST_ED_ALL_ED
Anesthesia Consult and Med Hx


Date of service: 01/06/21





- Airway


Anesthetic Teeth Evaluation: Good


ROM Head & Neck: Adequate


Mental/Hyoid Distance: Adequate


Mallampati Class: Class I


Intubation Access Assessment: Good





- Pre-Operative Health Status


ASA Pre-Surgery Classification: ASA2


Proposed Anesthetic Plan: General





- Pulmonary


Hx Asthma: Yes (As a child)


COPD: No


Hx Pneumonia: No





- Cardiovascular System


Hx Hypertension: No





- Central Nervous System


Hx Seizures: No


Hx Back Pain: Yes (back surgery for scoliosis (2005') - rods and screws)


Hx Psychiatric Problems: No





- Endocrine


Hx Renal Disease: No


Hx End Stage Renal Disease: No


Hx Hypothyroidism: No


Hx Hyperthyroidism: No





- Hematic


Hx Anemia: Yes


Hx Sickle Cell Disease: No





- Other Systems


Hx Alcohol Use: Yes (Occas)


Hx Cancer: No





- Additional Comments


Anesthesia Medical History Comments: herpes - takes meds for
no

## 2021-02-11 NOTE — ED BEHAVIORAL HEALTH ASSESSMENT NOTE - SUMMARY
Pt is a 32 y/o AA single MSM, domiciled alone, with PMHx HIV (sexually contracted, dx 2007, last CD4 360s, VLUD as of May 2019 per patient, on Biktarvy), unclear prior psychiatric diagnosis (possible anxiety, possible Bipolar disorder), ongoing IV crystal meth use (last use two weeks ago),  presenting to [facility] ED with complaints of R foot pain and swelling for the last 8 days.  Pt presents with fast, difficult to interrupt speech and anxious mood. Differential includes hypomanic episode vs anxiety. Pt is endorsing questionable PI ideation about his housing. However we will need to corroborate this with outpatient SW. There is no evidence of agitation or AVH/HI/SI. Pt demonstrates adequate understanding of his current medical issues, cooperative with care. STOP 2020 5 week  NVD- 2018- 40 3/7  7 lb 15 oz

## 2021-06-11 ENCOUNTER — NON-APPOINTMENT (OUTPATIENT)
Age: 36
End: 2021-06-11

## 2021-08-17 NOTE — ED ADULT NURSE NOTE - TEMPLATE
[facility] was called message left to return call.    When call is returned please schedule patient for a follow up with Dr Campbell.   Cold/Sinus

## 2021-09-13 NOTE — PROGRESS NOTE ADULT - PROBLEM SELECTOR PROBLEM 2
90 day supply  
HIV (human immunodeficiency virus infection)

## 2021-12-06 NOTE — ED ADULT TRIAGE NOTE - WEIGHT METHOD
Patient has been seeing Endocrine at [facility] for thyroid issues.    [facility] Endocrine referral pended for provider to approve and sign.      Nahum Hernández CMA ([facility]) at 1:45 PM on 12/6/2021      actual

## 2021-12-13 NOTE — ED BEHAVIORAL HEALTH ASSESSMENT NOTE - PROFESSIONAL COLLATERAL RELATIONSHIP
in the community Minoxidil Counseling: Minoxidil is a topical medication which can increase blood flow where it is applied. It is uncertain how this medication increases hair growth. Side effects are uncommon and include stinging and allergic reactions.

## 2022-05-01 NOTE — END OF VISIT
[] : Resident [>50% of Time Spent on Counseling for ____] : Greater than 50% of the encounter time was spent on counseling for [unfilled] [Time Spent: ___ minutes] : I have spent [unfilled] minutes of face to face time with the patient [FreeTextEntry3] : It seems  his tachycardia and fever is due to stimulant use\par No focal signs of infection \par \par Labs\par RTC in 2 days for reevaluation\par Avoid use of drugs till next visit DISPLAY PLAN FREE TEXT

## 2022-07-08 NOTE — PROGRESS NOTE BEHAVIORAL HEALTH - NSBHLOCFT_PSY_A_CORE
[phone]    Onset: Last 2 days   Location / description: Chest pain -on the right side  Precipitating Factors:   Pain Scale (1-10), 10 highest: 5/10  Associated Symptoms: Food gets stuck and needs to cough it up, last Friday food went down the wrong way and next day started having chest pain when breathing in    What improves / worsens symptoms:   Symptom specific medications:   Recent visits (last 3-4 weeks) for same reason or recent surgery:     PLAN:   Directed to Emergency Department    Patient/Caller agrees to follow recommendations.    Reason for Disposition  • Taking a deep breath makes pain worse    Protocols used: CHEST PAIN-A-AH       snowed on haldol and ativan

## 2023-07-27 NOTE — ED ADULT TRIAGE NOTE - NSWEIGHTCALCTOOLDRUG_GEN_A_CORE
----- Message from REGINE Dos Santos CNP sent at 7/27/2023  6:28 AM CDT -----  Please notify patient that her urine culture returned w/ normal urogenital zachary, she can stop her abx.     REGINE Dos Santos CNP      used

## 2024-01-05 NOTE — BEHAVIORAL HEALTH ASSESSMENT NOTE - SUICIDALITY
CPE done. Last Tdap was in 2021. Had COVID vaccines and Shingrix series. FBS, Labs are up to date. Last colonoscopy was in 2020 per patient and he will get us a copy. Pt advised to follow a well balanced, heart healthy diet and to exercise on a regular basis. Not a smoker. BP good.   
Discussed smaller portions, healthy snacks, and regular exercise.  
No recent symptoms. Continue esomeprazole 40 mg as needed and GERD diet.   
Pt doing ok. Takes alprazolam as needed.   
Was 68 in May 2023. Recheck LFTs.   
None known in lifetime

## 2024-07-23 NOTE — ED PROVIDER NOTE - PSYCHIATRIC, MLM
Addended by: DILCIA DENISE on: 7/23/2024 04:20 PM     Modules accepted: Orders    
Alert and oriented to person, place, time/situation.

## 2024-12-06 NOTE — ED ADULT TRIAGE NOTE - CHIEF COMPLAINT QUOTE
Patient came in for physical, per patient does not have surgery date.   Advised patient that once given a surgery date to please schedule pre-op exam.   Patient vandalized understanding.    right foot rash and pain x 1 week